# Patient Record
Sex: FEMALE | Race: BLACK OR AFRICAN AMERICAN | Employment: FULL TIME | URBAN - METROPOLITAN AREA
[De-identification: names, ages, dates, MRNs, and addresses within clinical notes are randomized per-mention and may not be internally consistent; named-entity substitution may affect disease eponyms.]

---

## 2017-01-08 ENCOUNTER — TELEPHONE (OUTPATIENT)
Dept: NURSING | Facility: CLINIC | Age: 42
End: 2017-01-08

## 2017-01-09 ENCOUNTER — OFFICE VISIT (OUTPATIENT)
Dept: URGENT CARE | Facility: URGENT CARE | Age: 42
End: 2017-01-09
Payer: COMMERCIAL

## 2017-01-09 VITALS
HEART RATE: 76 BPM | TEMPERATURE: 98.7 F | SYSTOLIC BLOOD PRESSURE: 114 MMHG | WEIGHT: 112 LBS | OXYGEN SATURATION: 100 % | DIASTOLIC BLOOD PRESSURE: 85 MMHG | BODY MASS INDEX: 19.22 KG/M2

## 2017-01-09 DIAGNOSIS — R30.0 DYSURIA: ICD-10-CM

## 2017-01-09 DIAGNOSIS — B37.31 YEAST VAGINITIS: Primary | ICD-10-CM

## 2017-01-09 LAB
ALBUMIN UR-MCNC: NEGATIVE MG/DL
APPEARANCE UR: CLEAR
BILIRUB UR QL STRIP: NEGATIVE
COLOR UR AUTO: YELLOW
GLUCOSE UR STRIP-MCNC: NEGATIVE MG/DL
HGB UR QL STRIP: ABNORMAL
KETONES UR STRIP-MCNC: NEGATIVE MG/DL
LEUKOCYTE ESTERASE UR QL STRIP: ABNORMAL
MICRO REPORT STATUS: ABNORMAL
NITRATE UR QL: NEGATIVE
PH UR STRIP: 7 PH (ref 5–7)
RBC #/AREA URNS AUTO: ABNORMAL /HPF (ref 0–2)
SP GR UR STRIP: 1.01 (ref 1–1.03)
SPECIMEN SOURCE: ABNORMAL
URN SPEC COLLECT METH UR: ABNORMAL
UROBILINOGEN UR STRIP-ACNC: 0.2 EU/DL (ref 0.2–1)
WBC #/AREA URNS AUTO: ABNORMAL /HPF (ref 0–2)
WET PREP SPEC: ABNORMAL

## 2017-01-09 PROCEDURE — 87086 URINE CULTURE/COLONY COUNT: CPT | Performed by: INTERNAL MEDICINE

## 2017-01-09 PROCEDURE — 87088 URINE BACTERIA CULTURE: CPT | Performed by: INTERNAL MEDICINE

## 2017-01-09 PROCEDURE — 87210 SMEAR WET MOUNT SALINE/INK: CPT | Performed by: INTERNAL MEDICINE

## 2017-01-09 PROCEDURE — 81001 URINALYSIS AUTO W/SCOPE: CPT | Performed by: INTERNAL MEDICINE

## 2017-01-09 PROCEDURE — 99213 OFFICE O/P EST LOW 20 MIN: CPT

## 2017-01-09 RX ORDER — FLUCONAZOLE 150 MG/1
150 TABLET ORAL ONCE
Qty: 1 TABLET | Refills: 0 | Status: SHIPPED | OUTPATIENT
Start: 2017-01-09 | End: 2017-01-09

## 2017-01-09 NOTE — TELEPHONE ENCOUNTER
"Call Type: Triage Call    Presenting Problem: \"Vagina pain and burning with urination. Had  surgery in May of 2105 and it hurts and burns up in my vagina, and  also abdomen hurts a little when I sit down and move around.\"  Triage Note:  Guideline Title: Vaginal Discharge or Irritation ; Vaginal Discharge or  Irritation  Recommended Disposition: See ED Immediately  Original Inclination: Wanted to speak with a nurse  Override Disposition: Activate   Intended Action: Patient does not know  Physician Contacted: No  Abdominal pain with pressure or jarring lasting 3 hours or more ?  YES  Using a pessary ? NO  Known or suspected pubic lice ? NO  Known or suspected vaginal foreign body ? NO  Abdominal/pelvic pain/cramping ? NO  Constant lower abdominal or pelvic pain lasting 3 hours or more ? NO  Unbearable abdominal/pelvic pain ? NO  Blisters or other lesions on vulva or vaginal opening ? NO  Recent childbirth or miscarriage ? NO  Physician Instructions:  Care Advice: Allow the patient to be in a position of comfort.  Another adult should drive.  Bring any tissue that has passed for examination by provider.  Do not eat or drink anything until evaluated by provider.  Call  if signs and symptoms of shock develop (such as unable to  stand due to faintness, dizziness, or lightheadedness  new onset of confusion  slow to respond or difficult to awaken  skin is pale, gray, cool, or moist to touch  severe weakness  loss of consciousness).  IMMEDIATE ACTION  Write down provider's name. List or place the following in a bag for  transport with the patient: current prescription and/or nonprescription  medications  alternative treatments, therapies and medications  and street drugs.  Refrain from douching, using feminine hygiene sprays, scented deodorant  tampons, or nonprescription intravaginal medication until evaluated by a  provider.  "

## 2017-01-09 NOTE — MR AVS SNAPSHOT
After Visit Summary   1/9/2017    Cindi Aguayo    MRN: [id number]           Patient Information     Date Of Birth          1975        Visit Information        Provider Department      1/9/2017 6:00 PM Provider, [facility]        Today's Diagnoses     Dysuria    -  1     Acute cystitis without hematuria         Yeast vaginitis           Care Instructions    Diflucan pill.  For comfort, can buy lotrimin vaginal cream     Specimen Description      Vagina     Wet Prep (Abnormal)     Yeast seen   No Trichomonas seen   No clue cells seen        Micro Report Status     FINAL 01/09/2017         Component      Latest Ref Rng 1/9/2017   Color Urine       Yellow   Appearance Urine       Clear   Glucose Urine      NEG mg/dL Negative   Bilirubin Urine      NEG Negative   Ketones Urine      NEG mg/dL Negative   Specific Gravity Urine      1.003 - 1.035 1.010   Blood Urine      NEG Small (A)   pH Urine      5.0 - 7.0 pH 7.0   Protein Albumin Urine      NEG mg/dL Negative   Urobilinogen Urine      0.2 - 1.0 EU/dL 0.2   Nitrite Urine      NEG Negative   Leukocyte Esterase Urine      NEG Trace (A)   Source       Midstream Urine   WBC Urine      0 - 2 /HPF O - 2   RBC Urine      0 - 2 /HPF 2-5 (A)     Vaginal Infection: Yeast (Candidiasis)  Yeast infection occurs when yeast in the vagina increase and start attacking the vaginal tissues. Yeast is a type of fungus. These infections are often caused by a type of yeast called Candida albicans. Other species of yeast can also cause infections. Factors that may make infection more likely include recent antibiotic use, douching, or increased sex. Yeast infections are more common in women who have diabetes, or are obese or pregnant, or have a weak immune system.  Symptoms of yeast infection    Clumpy or thin, white discharge, which may look like cottage cheese    No odor or minimal odor    Severe vaginal itching or  burning    Burning with urination    Swelling, redness of vulva    Pain during sex  Treating yeast infection  Yeast infection is treated with a vaginal antifungal cream. In some cases, antifungal pills are prescribed instead. During treatment:    Finish all of your medicine, even if your symptoms go away.    Apply the cream before going to bed. Lie flat after applying so that it doesn't drip out.    Do not douche or use tampons.    Don't rely on a diaphragm or condoms, since the cream may weaken them.    Avoid intercourse if advised by your healthcare provider.     Should I treat a yeast infection myself?  Discuss with your healthcare provider whether you should use over-the-counter medicines to treat a yeast infection. Self-treatment may depend on whether:    You've had a yeast infection in the past.    You're at risk for STDs.  Call your healthcare provider if symptoms do not go away or come back after treatment.     3772-0760 [employer]. [address] [place] [address]. All rights reserved. This information is not intended as a substitute for professional medical care. Always follow your healthcare professional's instructions.              Follow-ups after your visit        Who to contact     If you have questions or need follow up information about today's clinic visit or your schedule please contact [facility] directly at [phone].  Normal or non-critical lab and imaging results will be communicated to you by MyChart, letter or phone within 4 business days after the clinic has received the results. If you do not hear from us within 7 days, please contact the clinic through MyChart or phone. If you have a critical or abnormal lab result, we will notify you by phone as soon as possible.  Submit refill requests through [employer] or call your pharmacy and they will forward the refill request to us. Please allow 3 business days for your refill to be completed.        "   Additional Information About Your Visit        MyChart Information     [employer] lets you send messages to your doctor, view your test results, renew your prescriptions, schedule appointments and more. To sign up, go to [email or web address] . Click on \"Log in\" on the left side of the screen, which will take you to the Welcome page. Then click on \"Sign up Now\" on the right side of the page.     You will be asked to enter the access code listed below, as well as some personal information. Please follow the directions to create your username and password.     Your access code is: [email or web address]  Expires: 2017  8:01 PM     Your access code will  in 90 days. If you need help or a new code, please call your [facility] clinic or [phone].        Care EveryWhere ID     This is your Care EveryWhere ID. This could be used by other organizations to access your [facility] medical records  [id number]        Your Vitals Were     Pulse Temperature Pulse Oximetry Last Period          76 98.7  F (37.1  C) (Tympanic) 100% 2016         Blood Pressure from Last 3 Encounters:   17 114/85   16 116/72   16 100/70    Weight from Last 3 Encounters:   17 112 lb (50.803 kg)   16 106 lb (48.081 kg)   16 102 lb (46.267 kg)              We Performed the Following     *UA reflex to Microscopic and Culture ([facility] and [facility] (except [place] and [place])     Urine Culture Aerobic Bacterial     Urine Microscopic     Wet prep          Today's Medication Changes          These changes are accurate as of: 17  8:01 PM.  If you have any questions, ask your nurse or doctor.               Start taking these medicines.        Dose/Directions    fluconazole 150 MG tablet   Commonly known as:  DIFLUCAN   Used for:  Yeast vaginitis   Started by:  Provider, Sherita Shabazz        Dose:  150 mg   Take 1 tablet (150 mg) by mouth once for 1 dose   Quantity:  1 tablet   Refills:  0 "            Where to get your medicines      These medications were sent to [facility] - [place], [place] - [address] AT [facility]  [address], [place] [address]     Phone:  [phone]    - fluconazole 150 MG tablet             Primary Care Provider Office Phone # Fax #    Jillian Aguilera [phone]       [place] [address] [facility] [address]  [facility] [address]        Thank you!     Thank you for choosing [facility]  for your care. Our goal is always to provide you with excellent care. Hearing back from our patients is one way we can continue to improve our services. Please take a few minutes to complete the written survey that you may receive in the mail after your visit with us. Thank you!             Your Updated Medication List - Protect others around you: Learn how to safely use, store and throw away your medicines at www.disposemymeds.org.          This list is accurate as of: 1/9/17  8:01 PM.  Always use your most recent med list.                   Brand Name Dispense Instructions for use    calcium carbonate 500 MG chewable tablet    TUMS     Take 1 chew tab by mouth daily       diphenhydrAMINE 25 MG tablet    BENADRYL    60 tablet    Take 1-2 tablets at bedtime for sleep.       docusate sodium 100 MG tablet    COLACE    60 tablet    Take 100 mg by mouth daily       ferrous sulfate 325 (65 FE) MG tablet    IRON    60 tablet    Take 1 tablet (325 mg) by mouth 2 times daily       fluconazole 150 MG tablet    DIFLUCAN    1 tablet    Take 1 tablet (150 mg) by mouth once for 1 dose       fluticasone 50 MCG/ACT spray    FLONASE    1 Package    Spray 1-2 sprays into both nostrils daily       ketoconazole 2 % cream    NIZORAL    15 g    Apply topically 2 times daily as needed for itching       M-VIT Tabs     100 tablet    Take 1 tablet by mouth daily       norethindrone-ethinyl estradiol 1.5-30 MG-MCG per tablet     GILDESS 1.5/30    84 tablet    Take 1 tablet by mouth daily       omeprazole 20 MG tablet     180 tablet    Take 1 tablet (20 mg) by mouth 2 times daily Take 30-60 minutes before a meal.       pantoprazole 20 MG EC tablet    PROTONIX    90 tablet    Take 1 tablet (20 mg) by mouth 2 times daily (before meals)       pramox-pe-glycerin-petrolatum 1-0.25-14.4-15 % Crea cream    PREPARATION H    1 Tube    Place rectally 3 times daily       triamcinolone 0.1 % cream    KENALOG    30 g    Apply sparingly to affected area three times daily for 14 days.

## 2017-01-10 NOTE — PATIENT INSTRUCTIONS
Diflucan pill.  For comfort, can buy lotrimin vaginal cream     Specimen Description      Vagina     Wet Prep (Abnormal)     Yeast seen   No Trichomonas seen   No clue cells seen        Micro Report Status     FINAL 01/09/2017         Component      Latest Ref Rng 1/9/2017   Color Urine       Yellow   Appearance Urine       Clear   Glucose Urine      NEG mg/dL Negative   Bilirubin Urine      NEG Negative   Ketones Urine      NEG mg/dL Negative   Specific Gravity Urine      1.003 - 1.035 1.010   Blood Urine      NEG Small (A)   pH Urine      5.0 - 7.0 pH 7.0   Protein Albumin Urine      NEG mg/dL Negative   Urobilinogen Urine      0.2 - 1.0 EU/dL 0.2   Nitrite Urine      NEG Negative   Leukocyte Esterase Urine      NEG Trace (A)   Source       Midstream Urine   WBC Urine      0 - 2 /HPF O - 2   RBC Urine      0 - 2 /HPF 2-5 (A)     Vaginal Infection: Yeast (Candidiasis)  Yeast infection occurs when yeast in the vagina increase and start attacking the vaginal tissues. Yeast is a type of fungus. These infections are often caused by a type of yeast called Candida albicans. Other species of yeast can also cause infections. Factors that may make infection more likely include recent antibiotic use, douching, or increased sex. Yeast infections are more common in women who have diabetes, or are obese or pregnant, or have a weak immune system.  Symptoms of yeast infection    Clumpy or thin, white discharge, which may look like cottage cheese    No odor or minimal odor    Severe vaginal itching or burning    Burning with urination    Swelling, redness of vulva    Pain during sex  Treating yeast infection  Yeast infection is treated with a vaginal antifungal cream. In some cases, antifungal pills are prescribed instead. During treatment:    Finish all of your medicine, even if your symptoms go away.    Apply the cream before going to bed. Lie flat after applying so that it doesn't drip out.    Do not douche or use  tampons.    Don't rely on a diaphragm or condoms, since the cream may weaken them.    Avoid intercourse if advised by your healthcare provider.     Should I treat a yeast infection myself?  Discuss with your healthcare provider whether you should use over-the-counter medicines to treat a yeast infection. Self-treatment may depend on whether:    You've had a yeast infection in the past.    You're at risk for STDs.  Call your healthcare provider if symptoms do not go away or come back after treatment.     9477-6259 [employer]. [address] [place] [address], [place] [address]. All rights reserved. This information is not intended as a substitute for professional medical care. Always follow your healthcare professional's instructions.

## 2017-01-10 NOTE — NURSING NOTE
"Chief Complaint   Patient presents with     Urgent Care     Pt in clinic to have eval for dysuria and frequency.     Dysuria       Initial /85 mmHg  Pulse 76  Temp(Src) 98.7  F (37.1  C) (Tympanic)  Wt 112 lb (50.803 kg)  SpO2 100%  LMP 01/29/2016 Estimated body mass index is 19.22 kg/(m^2) as calculated from the following:    Height as of 3/30/16: 5' 4\" (1.626 m).    Weight as of this encounter: 112 lb (50.803 kg).  BP completed using cuff size: regular  Carmen Poe/ MA    "

## 2017-01-10 NOTE — PROGRESS NOTES
SUBJECTIVE: Cindi Aguayo is a 41 year old female who complains of urinary frequency, urgency and dysuria x 3 days,   Chief Complaint   Patient presents with     Urgent Care     Pt in clinic to have eval for dysuria and frequency.     Dysuria     Hard to hold urine  without flank pain, fever, chills, or abnormal vaginal discharge or bleeding.   Balderas inside vagina    OBJECTIVE:   /85 mmHg  Pulse 76  Temp(Src) 98.7  F (37.1  C) (Tympanic)  Wt 112 lb (50.803 kg)  SpO2 100%  LMP 01/29/2016    Appears well, in no apparent distress.  Vital signs are normal. The abdomen is soft without tenderness, guarding, mass, rebound or organomegaly. No CVA tenderness  Vaginal area appears irritated.  Wet prep performed    Component      Latest Ref Rng 1/9/2017   Color Urine       Yellow   Appearance Urine       Clear   Glucose Urine      NEG mg/dL Negative   Bilirubin Urine      NEG Negative   Ketones Urine      NEG mg/dL Negative   Specific Gravity Urine      1.003 - 1.035 1.010   Blood Urine      NEG Small (A)   pH Urine      5.0 - 7.0 pH 7.0   Protein Albumin Urine      NEG mg/dL Negative   Urobilinogen Urine      0.2 - 1.0 EU/dL 0.2   Nitrite Urine      NEG Negative   Leukocyte Esterase Urine      NEG Trace (A)   Source       Midstream Urine   WBC Urine      0 - 2 /HPF O - 2   RBC Urine      0 - 2 /HPF 2-5 (A)       ASSESSMENT:  (B37.3) Yeast vaginitis  (primary encounter diagnosis)  Comment:   Plan: fluconazole (DIFLUCAN) 150 MG tablet            (R30.0) Dysuria  Comment:   Plan: *UA reflex to Microscopic and Culture         ([facility] (except         [place] and [place]), Urine Microscopic,         Wet prep             Patient Instructions     Diflucan pill.  For comfort, can buy lotrimin vaginal cream     Specimen Description      Vagina     Wet Prep (Abnormal)     Yeast seen   No Trichomonas seen   No clue cells seen        Micro Report Status     FINAL 01/09/2017          Component      Latest Ref Rng 1/9/2017   Color Urine       Yellow   Appearance Urine       Clear   Glucose Urine      NEG mg/dL Negative   Bilirubin Urine      NEG Negative   Ketones Urine      NEG mg/dL Negative   Specific Gravity Urine      1.003 - 1.035 1.010   Blood Urine      NEG Small (A)   pH Urine      5.0 - 7.0 pH 7.0   Protein Albumin Urine      NEG mg/dL Negative   Urobilinogen Urine      0.2 - 1.0 EU/dL 0.2   Nitrite Urine      NEG Negative   Leukocyte Esterase Urine      NEG Trace (A)   Source       Midstream Urine   WBC Urine      0 - 2 /HPF O - 2   RBC Urine      0 - 2 /HPF 2-5 (A)     Vaginal Infection: Yeast (Candidiasis)  Yeast infection occurs when yeast in the vagina increase and start attacking the vaginal tissues. Yeast is a type of fungus. These infections are often caused by a type of yeast called Candida albicans. Other species of yeast can also cause infections. Factors that may make infection more likely include recent antibiotic use, douching, or increased sex. Yeast infections are more common in women who have diabetes, or are obese or pregnant, or have a weak immune system.  Symptoms of yeast infection    Clumpy or thin, white discharge, which may look like cottage cheese    No odor or minimal odor    Severe vaginal itching or burning    Burning with urination    Swelling, redness of vulva    Pain during sex  Treating yeast infection  Yeast infection is treated with a vaginal antifungal cream. In some cases, antifungal pills are prescribed instead. During treatment:    Finish all of your medicine, even if your symptoms go away.    Apply the cream before going to bed. Lie flat after applying so that it doesn't drip out.    Do not douche or use tampons.    Don't rely on a diaphragm or condoms, since the cream may weaken them.    Avoid intercourse if advised by your healthcare provider.     Should I treat a yeast infection myself?  Discuss with your healthcare provider whether you  should use over-the-counter medicines to treat a yeast infection. Self-treatment may depend on whether:    You've had a yeast infection in the past.    You're at risk for STDs.  Call your healthcare provider if symptoms do not go away or come back after treatment.     6499-3620 [employer]. [address] [place] [address], [place] [address]. All rights reserved. This information is not intended as a substitute for professional medical care. Always follow your healthcare professional's instructions.

## 2017-01-11 ENCOUNTER — TELEPHONE (OUTPATIENT)
Dept: URGENT CARE | Facility: URGENT CARE | Age: 42
End: 2017-01-11

## 2017-01-11 LAB
BACTERIA SPEC CULT: ABNORMAL
MICRO REPORT STATUS: ABNORMAL
SPECIMEN SOURCE: ABNORMAL

## 2017-01-11 NOTE — TELEPHONE ENCOUNTER
Inform patient that urine culture also grew out infection.  Call in Amoxicillin 500 mg 3 x day for 5 days.

## 2017-04-12 ENCOUNTER — CARE COORDINATION (OUTPATIENT)
Dept: GASTROENTEROLOGY | Facility: CLINIC | Age: 42
End: 2017-04-12

## 2017-04-12 NOTE — PROGRESS NOTES
Patient called to discuss medications and abdominal pain. Informed patient we have not prescribed medications in over 1 year and that her follow up h pylori test was negative for infections. Informed her that she would need to be seen in clinic to discuss ongoing symptoms as she has not been seen in over a year for evaluation. Informed her we would arrange for a consult in our general GI clinic to discuss further management of her abdominal pain.    Suzanne Sevilla RN

## 2017-04-13 ENCOUNTER — TELEPHONE (OUTPATIENT)
Dept: GASTROENTEROLOGY | Facility: CLINIC | Age: 42
End: 2017-04-13

## 2017-04-13 ENCOUNTER — PRE VISIT (OUTPATIENT)
Dept: GASTROENTEROLOGY | Facility: CLINIC | Age: 42
End: 2017-04-13

## 2017-04-13 NOTE — TELEPHONE ENCOUNTER
1.  Date/reason for appt: 4/24/17 2:30PM Abdominal Pain  2.  Referring provider: ARIANA VICTORIA MD  3.  Call to patient (Yes / No - short description): No, recs are in Epic   4.  Previous care at / records requested from:  [facility] notes - 11/10/2014  Upper EGD- 11/23/15   PACS -  NM Gastric Emptying  9/10/15

## 2017-04-18 ENCOUNTER — TELEPHONE (OUTPATIENT)
Dept: GASTROENTEROLOGY | Facility: CLINIC | Age: 42
End: 2017-04-18

## 2017-09-01 ENCOUNTER — TELEPHONE (OUTPATIENT)
Dept: OBGYN | Facility: CLINIC | Age: 42
End: 2017-09-01

## 2017-09-01 NOTE — TELEPHONE ENCOUNTER
"Patient calling regarding bladder concerns post surgery (had surgery at an outside clinic with another provider). Patient stated that since then she has been having recurrent UTIs and abdominal pain. Suggested that she call the clinic that did the surgery, but \"it is too far away\". I did suggest that she been seen in ER if pain severe. Can schedule with our clinic, but would be several weeks out. Patient stated that she wants to go to a clinic that they \"could scan my bladder and talk about my urine symptoms\" - number for Urology clinic given to patient. Will call back if needed.  Monika Gibbs    "

## 2017-09-05 ENCOUNTER — PRE VISIT (OUTPATIENT)
Dept: UROLOGY | Facility: CLINIC | Age: 42
End: 2017-09-05

## 2017-10-02 ENCOUNTER — PRE VISIT (OUTPATIENT)
Dept: UROLOGY | Facility: CLINIC | Age: 42
End: 2017-10-02

## 2017-10-02 NOTE — TELEPHONE ENCOUNTER
Patient with incontinence and dysuria coming in for a consult. Chart reviewed and all records available. Message left asking pt to come with a full bladder for a dip/pvr.

## 2017-10-05 ENCOUNTER — OFFICE VISIT (OUTPATIENT)
Dept: UROLOGY | Facility: CLINIC | Age: 42
End: 2017-10-05

## 2017-10-05 VITALS
BODY MASS INDEX: 18.98 KG/M2 | HEART RATE: 64 BPM | WEIGHT: 111.2 LBS | SYSTOLIC BLOOD PRESSURE: 111 MMHG | HEIGHT: 64 IN | DIASTOLIC BLOOD PRESSURE: 72 MMHG

## 2017-10-05 DIAGNOSIS — R10.2 PELVIC PRESSURE IN FEMALE: ICD-10-CM

## 2017-10-05 DIAGNOSIS — N39.41 URGE INCONTINENCE: Primary | ICD-10-CM

## 2017-10-05 LAB
ALBUMIN UR-MCNC: NEGATIVE MG/DL
APPEARANCE UR: CLEAR
BILIRUB UR QL STRIP: NEGATIVE
COLOR UR AUTO: YELLOW
GLUCOSE UR STRIP-MCNC: NEGATIVE MG/DL
HGB UR QL STRIP: NEGATIVE
KETONES UR STRIP-MCNC: NEGATIVE MG/DL
LEUKOCYTE ESTERASE UR QL STRIP: NEGATIVE
MUCOUS THREADS #/AREA URNS LPF: PRESENT /LPF
NITRATE UR QL: NEGATIVE
PH UR STRIP: 7 PH (ref 5–7)
RBC #/AREA URNS AUTO: 1 /HPF (ref 0–2)
SOURCE: ABNORMAL
SP GR UR STRIP: 1.01 (ref 1–1.03)
SQUAMOUS #/AREA URNS AUTO: 1 /HPF (ref 0–1)
UROBILINOGEN UR STRIP-MCNC: 0 MG/DL (ref 0–2)
WBC #/AREA URNS AUTO: 1 /HPF (ref 0–2)

## 2017-10-05 ASSESSMENT — ENCOUNTER SYMPTOMS
JOINT SWELLING: 0
BACK PAIN: 0
HEADACHES: 0
TACHYCARDIA: 0
PANIC: 0
LEG SWELLING: 0
SINUS PAIN: 0
ABDOMINAL PAIN: 1
HEMOPTYSIS: 0
MUSCLE WEAKNESS: 0
ARTHRALGIAS: 0
DECREASED CONCENTRATION: 0
SHORTNESS OF BREATH: 0
HYPERTENSION: 0
WEAKNESS: 0
SMELL DISTURBANCE: 0
SWOLLEN GLANDS: 0
DISTURBANCES IN COORDINATION: 0
WHEEZING: 0
COUGH: 0
MEMORY LOSS: 0
POOR WOUND HEALING: 0
RESPIRATORY PAIN: 0
INSOMNIA: 0
LIGHT-HEADEDNESS: 0
PALPITATIONS: 0
HOARSE VOICE: 0
EXERCISE INTOLERANCE: 0
SORE THROAT: 0
SINUS CONGESTION: 0
MYALGIAS: 0
HOT FLASHES: 0
SYNCOPE: 0
NECK MASS: 0
NUMBNESS: 0
TREMORS: 0
SKIN CHANGES: 0
ORTHOPNEA: 0
POSTURAL DYSPNEA: 0
TASTE DISTURBANCE: 0
FEVER: 1
DECREASED LIBIDO: 0
EYE PAIN: 1
HYPOTENSION: 0
LOSS OF CONSCIOUSNESS: 0
SEIZURES: 0
DYSPNEA ON EXERTION: 0
SLEEP DISTURBANCES DUE TO BREATHING: 0
NAIL CHANGES: 0
SPUTUM PRODUCTION: 0
SNORES LOUDLY: 0
EXTREMITY NUMBNESS: 0
DEPRESSION: 0
LEG PAIN: 0
NECK PAIN: 0
NERVOUS/ANXIOUS: 0
BREAST MASS: 0
BRUISES/BLEEDS EASILY: 0
CLAUDICATION: 0
BREAST PAIN: 0
MUSCLE CRAMPS: 0
PARALYSIS: 0
STIFFNESS: 0
DIZZINESS: 0
SPEECH CHANGE: 0
COUGH DISTURBING SLEEP: 0
TROUBLE SWALLOWING: 0
TINGLING: 0

## 2017-10-05 ASSESSMENT — PAIN SCALES - GENERAL: PAINLEVEL: NO PAIN (0)

## 2017-10-05 NOTE — PROGRESS NOTES
October 5, 2017    Referring Provider: Referred Self, MD  No address on file    Primary Care Provider: Jillian Aguilera    CC: Urge incontinence    HPI:  Cindi Aguayo is a 42 year old female who presents for evaluation of her pelvic floor symptoms.  She underwent a robotic assisted hysterectomy 5/2016 menorrhagia.  Since that surgery she has had crampy abdominal pain, does not take anything for.  She also notes some urinary urgency incontinence as well.  She states that after she has had multiple urgent care visits, did get treated for group B strep back in January which did transiently helped some.  Has a lot of pelvic pressure.  Is sexually active, has had dyspareunia even prior to surgery.    Denies hematuria, vaginal bleeding, vaginal bulge, constipation.      Denies history of abuse.  Reports that she feels safe at home.    Past Medical History:   Diagnosis Date     Anorexia      Fibroids, intramural 1/2015    2 x 2 cm impinging on cavity uterus     Nausea and vomiting      Sleep deprivation      Weight loss      Past Surgical History:   Procedure Laterality Date     C IUD,MIRENA  1/13/15-3/30/16    removed for bleeding/discharge     ESOPHAGOSCOPY, GASTROSCOPY, DUODENOSCOPY (EGD), COMBINED Left 11/23/2015    Procedure: COMBINED ESOPHAGOSCOPY, GASTROSCOPY, DUODENOSCOPY (EGD), BIOPSY SINGLE OR MULTIPLE;  Surgeon: Brendan Tony MD;  Location:  GI     NO HISTORY OF SURGERY       Social History     Social History     Marital status:      Spouse name: N/A     Number of children: 4     Years of education: N/A     Occupational History      Unemployed     Social History Main Topics     Smoking status: Never Smoker     Smokeless tobacco: Never Used     Alcohol use No     Drug use: No     Sexual activity: Yes     Partners: Male     Birth control/ protection: IUD      Comment: mirena 1/15     Other Topics Concern     Not on file     Social History Narrative       Family History   Problem Relation  Age of Onset     Family History Negative No family hx of      Review of Systems     Constitutional:  Positive for fever.   HENT:  Negative for ear pain, hearing loss, tinnitus, nosebleeds, trouble swallowing, hoarse voice, mouth sores, sore throat, ear discharge, tooth pain, gum tenderness, taste disturbance, smell disturbance, hearing aid, bleeding gums, dry mouth, sinus pain, sinus congestion and neck mass.    Eyes:  Positive for pain and eye pain.   Respiratory:   Negative for cough, hemoptysis, sputum production, shortness of breath, wheezing, sleep disturbances due to breathing, snores loudly, respiratory pain, dyspnea on exertion, cough disturbing sleep and postural dyspnea.    Cardiovascular:  Negative for chest pain, dyspnea on exertion, palpitations, orthopnea, claudication, leg swelling, fingers/toes turn blue, hypertension, hypotension, syncope, history of heart murmur, chest pain on exertion, chest pain at rest, pacemaker, few scattered varicosities, leg pain, sleep disturbances due to breathing, tachycardia, light-headedness, exercise intolerance and edema.   Gastrointestinal:  Positive for abdominal pain.   Genitourinary:  Negative for vaginal discharge, genital sores, dyspareunia, decreased libido, arousal difficulty, abnormal vaginal bleeding, excessive menstruation, menstrual changes, hot flashes, vaginal dryness and postmenopausal bleeding.   Musculoskeletal:  Negative for myalgias, back pain, joint swelling, arthralgias, stiffness, muscle cramps, neck pain, bone pain, muscle weakness and fracture.   Skin:  Negative for nail changes, itching, poor wound healing, rash, hair changes, skin changes, acne, warts, poor wound healing, scarring, flaky skin, Raynaud's phenomenon, sensitivity to sunlight and skin thickening.   Neurological:  Negative for dizziness, tingling, tremors, speech change, seizures, loss of consciousness, weakness, light-headedness, numbness, headaches, disturbances in coordination,  "extremity numbness, memory loss, difficulty walking and paralysis.   Endo/Heme:  Negative for anemia, swollen glands and bruises/bleeds easily.   Psychiatric/Behavioral:  Negative for depression, memory loss, decreased concentration, mood swings and panic attacks.    Breast:  Negative for breast discharge, breast mass, breast pain and nipple retraction.   Endocrine:  Negative for unwanted hair growth and change in facial hair.    Allergies   Allergen Reactions     No Known Drug Allergies        No current outpatient prescriptions on file.     No current facility-administered medications for this visit.      /72  Pulse 64  Ht 1.626 m (5' 4\")  Wt 50.4 kg (111 lb 3.2 oz)  LMP 01/29/2016  BMI 19.09 kg/m2 Patient's last menstrual period was 01/29/2016. Body mass index is 19.09 kg/(m^2).  She is alert, comfortable in no acute distress, non-labored breathing. Pelvic exam deferred to the time of cystoscopy    Urine dip negative    PVR 12 mL by bladder scan    A/P: Cindi Aguayo is a 42 year old F with urinary urgency incontinence and pelvic pressure since hysterectomy, history of dyspareunia     At this time patient is very concerned that her symptoms are related to something from her prior surgery.  We discussed that it is reasonable to do a cystoscopy to evaluate the interior of her bladder given the symptoms started after her hysterectomy.      Although patient speaks English well, we discussed that when she returns for a cystoscopy and pelvic exam that I would like an  to be present to make sure that we were communicating appropriately.    Also sent urine for ureaplasma, mycoplasma, urinalysis.    30 minutes were spent with the patient today, > 50% in counseling and coordination of care    Blanca Roth MD MPH    Urology    CC  Patient Care Team:  Jillian Aguilera MD as PCP - General (OB/Gyn)  Brendan Tony MD as MD (Gastroenterology)  Cleo Elder, " APRN CNP as Nurse Practitioner (Nurse Practitioner)  Blanca Roth MD as MD (Urology)  Sandra Fletcher, RN as Registered Nurse (Urology)  SELF, REFERRED

## 2017-10-05 NOTE — LETTER
10/5/2017       RE: Cindi Aguayo  [address] [place] [address]     Dear Colleague,    Thank you for referring your patient, Cindi Aguayo, to the [facility] UROLOGY AND INST FOR PROSTATE AND UROLOGIC CANCERS at [facility]. Please see a copy of my visit note below.    October 5, 2017    Referring Provider: Referred Self, MD  No address on file    Primary Care Provider: Jillian Aguilera    CC: Urge incontinence    HPI:  Cindi Aguayo is a 42 year old female who presents for evaluation of her pelvic floor symptoms.  She underwent a robotic assisted hysterectomy 5/2016 menorrhagia.  Since that surgery she has had crampy abdominal pain, does not take anything for.  She also notes some urinary urgency incontinence as well.  She states that after she has had multiple urgent care visits, did get treated for group B strep back in January which did transiently helped some.  Has a lot of pelvic pressure.  Is sexually active, has had dyspareunia even prior to surgery.    Denies hematuria, vaginal bleeding, vaginal bulge, constipation.      Denies history of abuse.  Reports that she feels safe at home.    Past Medical History:   Diagnosis Date     Anorexia      Fibroids, intramural 1/2015    2 x 2 cm impinging on cavity uterus     Nausea and vomiting      Sleep deprivation      Weight loss      Past Surgical History:   Procedure Laterality Date     C IUD,MIRENA  1/13/15-3/30/16    removed for bleeding/discharge     ESOPHAGOSCOPY, GASTROSCOPY, DUODENOSCOPY (EGD), COMBINED Left 11/23/2015    Procedure: COMBINED ESOPHAGOSCOPY, GASTROSCOPY, DUODENOSCOPY (EGD), BIOPSY SINGLE OR MULTIPLE;  Surgeon: Brendan Tony MD;  Location: [facility]     NO HISTORY OF SURGERY       Social History     Social History     Marital status:      Spouse name: N/A     Number of children: 4     Years of education: N/A     Occupational History      Unemployed     Social History Main  Topics     Smoking status: Never Smoker     Smokeless tobacco: Never Used     Alcohol use No     Drug use: No     Sexual activity: Yes     Partners: Male     Birth control/ protection: IUD      Comment: mirena 1/15     Other Topics Concern     Not on file     Social History Narrative       Family History   Problem Relation Age of Onset     Family History Negative No family hx of      Review of Systems     Constitutional:  Positive for fever.   HENT:  Negative for ear pain, hearing loss, tinnitus, nosebleeds, trouble swallowing, hoarse voice, mouth sores, sore throat, ear discharge, tooth pain, gum tenderness, taste disturbance, smell disturbance, hearing aid, bleeding gums, dry mouth, sinus pain, sinus congestion and neck mass.    Eyes:  Positive for pain and eye pain.   Respiratory:   Negative for cough, hemoptysis, sputum production, shortness of breath, wheezing, sleep disturbances due to breathing, snores loudly, respiratory pain, dyspnea on exertion, cough disturbing sleep and postural dyspnea.    Cardiovascular:  Negative for chest pain, dyspnea on exertion, palpitations, orthopnea, claudication, leg swelling, fingers/toes turn blue, hypertension, hypotension, syncope, history of heart murmur, chest pain on exertion, chest pain at rest, pacemaker, few scattered varicosities, leg pain, sleep disturbances due to breathing, tachycardia, light-headedness, exercise intolerance and edema.   Gastrointestinal:  Positive for abdominal pain.   Genitourinary:  Negative for vaginal discharge, genital sores, dyspareunia, decreased libido, arousal difficulty, abnormal vaginal bleeding, excessive menstruation, menstrual changes, hot flashes, vaginal dryness and postmenopausal bleeding.   Musculoskeletal:  Negative for myalgias, back pain, joint swelling, arthralgias, stiffness, muscle cramps, neck pain, bone pain, muscle weakness and fracture.   Skin:  Negative for nail changes, itching, poor wound healing, rash, hair  "changes, skin changes, acne, warts, poor wound healing, scarring, flaky skin, Raynaud's phenomenon, sensitivity to sunlight and skin thickening.   Neurological:  Negative for dizziness, tingling, tremors, speech change, seizures, loss of consciousness, weakness, light-headedness, numbness, headaches, disturbances in coordination, extremity numbness, memory loss, difficulty walking and paralysis.   Endo/Heme:  Negative for anemia, swollen glands and bruises/bleeds easily.   Psychiatric/Behavioral:  Negative for depression, memory loss, decreased concentration, mood swings and panic attacks.    Breast:  Negative for breast discharge, breast mass, breast pain and nipple retraction.   Endocrine:  Negative for unwanted hair growth and change in facial hair.    Allergies   Allergen Reactions     No Known Drug Allergies        No current outpatient prescriptions on file.     No current facility-administered medications for this visit.      /72  Pulse 64  Ht 1.626 m (5' 4\")  Wt 50.4 kg (111 lb 3.2 oz)  LMP 01/29/2016  BMI 19.09 kg/m2 Patient's last menstrual period was 01/29/2016. Body mass index is 19.09 kg/(m^2).  She is alert, comfortable in no acute distress, non-labored breathing. Pelvic exam deferred to the time of cystoscopy    Urine dip negative    PVR 12 mL by bladder scan    A/P: Cindi Aguayo is a 42 year old F with urinary urgency incontinence and pelvic pressure since hysterectomy, history of dyspareunia     At this time patient is very concerned that her symptoms are related to something from her prior surgery.  We discussed that it is reasonable to do a cystoscopy to evaluate the interior of her bladder given the symptoms started after her hysterectomy.      Although patient speaks English well, we discussed that when she returns for a cystoscopy and pelvic exam that I would like an  to be present to make sure that we were communicating appropriately.    Also sent urine for " ureaplasma, mycoplasma, urinalysis.    30 minutes were spent with the patient today, > 50% in counseling and coordination of care    Blanca Roth MD MPH    Urology    CC  Patient Care Team:  Jillian Aguilera MD as PCP - General (OB/Gyn)  Brendan Tony MD as MD (Gastroenterology)  Cleo Elder, APRN CNP as Nurse Practitioner (Nurse Practitioner)  Blanca Roth MD as MD (Urology)  Sandra Fletcher, RN as Registered Nurse (Urology)  SELF, REFERRED

## 2017-10-05 NOTE — PATIENT INSTRUCTIONS
Please return for a cystoscopy (look in the bladder) and pelvic exam and schedule this appointment with a female . Patient will call to schedule, she was given number.    We will let you know if the urine tests are abnormal or if after reviewing your records we recommend any more testing

## 2017-10-05 NOTE — MR AVS SNAPSHOT
After Visit Summary   10/5/2017    Cindi Aguayo    MRN: [id number]           Patient Information     Date Of Birth          1975        Visit Information        Provider Department      10/5/2017 12:30 PM Blanca Roth MD [facility] Urology and [facility] for Prostate and Urologic Cancers        Today's Diagnoses     Urge incontinence    -  1    Pelvic pressure in female          Care Instructions    Please return for a cystoscopy (look in the bladder) and pelvic exam and schedule this appointment with a female .    We will let you know if the urine tests are abnormal or if after reviewing your records we recommend any more testing              Follow-ups after your visit        Future tests that were ordered for you today     Open Future Orders        Priority Expected Expires Ordered    Routine UA with micro reflex to culture Routine  10/5/2018 10/5/2017            Who to contact     Please call your clinic at [phone] to:    Ask questions about your health    Make or cancel appointments    Discuss your medicines    Learn about your test results    Speak to your doctor   If you have compliments or concerns about an experience at your clinic, or if you wish to file a complaint, please contact [facility] Patient Relations at [phone] or email us at [email or web address]         Additional Information About Your Visit        MyChart Information     The [employer] is an electronic gateway that provides easy, online access to your medical records. With [employer], you can request a clinic appointment, read your test results, renew a prescription or communicate with your care team.     To sign up for Seent visit the website at [email or web address]   You will be asked to enter the access code listed below, as well as some personal information. Please follow the directions to create your username and password.     Your access code is:  [email or web address]  Expires: 2017  6:30 AM     Your access code will  in 90 days. If you need help or a new code, please contact your [facility] or call [phone] for assistance.        Care EveryWhere ID     This is your Care EveryWhere ID. This could be used by other organizations to access your [facility] medical records  [id number]        Your Vitals Were     Pulse Height Last Period BMI (Body Mass Index)          64 1.626 m (5' 4\") 2016 19.09 kg/m2         Blood Pressure from Last 3 Encounters:   10/05/17 111/72   17 114/85   16 116/72    Weight from Last 3 Encounters:   10/05/17 50.4 kg (111 lb 3.2 oz)   17 50.8 kg (112 lb)   16 48.1 kg (106 lb)              We Performed the Following     Mycoplasma large colony culture     Ureaplasma culture          Today's Medication Changes          These changes are accurate as of: 10/5/17  1:40 PM.  If you have any questions, ask your nurse or doctor.               Stop taking these medicines if you haven't already. Please contact your care team if you have questions.     calcium carbonate 500 MG chewable tablet   Commonly known as:  TUMS   Stopped by:  Blanca Roth MD           diphenhydrAMINE 25 MG tablet   Commonly known as:  BENADRYL   Stopped by:  Blanca Roth MD           docusate sodium 100 MG tablet   Commonly known as:  COLACE   Stopped by:  Blanca Roth MD           ferrous sulfate 325 (65 FE) MG tablet   Commonly known as:  IRON   Stopped by:  Blanca Roth MD           fluticasone 50 MCG/ACT spray   Commonly known as:  FLONASE   Stopped by:  Blanca Roth MD           ketoconazole 2 % cream   Commonly known as:  NIZORAL   Stopped by:  Blanca Roth MD           M-VIT Tabs   Stopped by:  Blanca Roth MD           norethindrone-ethinyl estradiol 1.5-30 MG-MCG per tablet   Commonly known as:  GILDESS 1.5/30   Stopped by:  Ira" Blanca Purcell MD           omeprazole 20 MG tablet   Stopped by:  Blanca Roth MD           pantoprazole 20 MG EC tablet   Commonly known as:  PROTONIX   Stopped by:  Blanca Roth MD           pramox-pe-glycerin-petrolatum 1-0.25-14.4-15 % Crea cream   Commonly known as:  PREPARATION H   Stopped by:  Blanca Roth MD           triamcinolone 0.1 % cream   Commonly known as:  KENALOG   Stopped by:  Blanca Roth MD                    Primary Care Provider Office Phone # Fax #    Jillian A MD Willy [phone]       [address] [address]        Equal Access to Services     [facility] Center[facility] : Hadii rosalie pnoce hadasho Soomaali, waaxda luqadaha, qaybta kaalmada adeegyada, david swenson . So [facility] [phone].    ATENCIÓN: Si habla español, tiene a fink disposición servicios gratuitos de asistencia lingüística. Llame al [phone].    We comply with applicable federal civil rights laws and [place] laws. We do not discriminate on the basis of race, color, national origin, age, disability, sex, sexual orientation, or gender identity.            Thank you!     Thank you for choosing [facility] UROLOGY AND [facility] FOR PROSTATE AND UROLOGIC CANCERS  for your care. Our goal is always to provide you with excellent care. Hearing back from our patients is one way we can continue to improve our services. Please take a few minutes to complete the written survey that you may receive in the mail after your visit with us. Thank you!             Your Updated Medication List - Protect others around you: Learn how to safely use, store and throw away your medicines at www.disposemymeds.org.      Notice  As of 10/5/2017  1:40 PM    You have not been prescribed any medications.

## 2017-10-05 NOTE — NURSING NOTE
"Chief Complaint   Patient presents with     Consult     Urinary urgency since hysterectomy       Blood pressure 111/72, pulse 64, height 1.626 m (5' 4\"), weight 50.4 kg (111 lb 3.2 oz), last menstrual period 01/29/2016, not currently breastfeeding. Body mass index is 19.09 kg/(m^2).    Patient Active Problem List   Diagnosis     Contraception     Esophageal reflux     CARDIOVASCULAR SCREENING; LDL GOAL LESS THAN 160       Allergies   Allergen Reactions     No Known Drug Allergies        No current outpatient prescriptions on file.       Social History   Substance Use Topics     Smoking status: Never Smoker     Smokeless tobacco: Never Used     Alcohol use DIVINE [place]  10/5/2017  12:33 PM       "

## 2017-10-12 LAB
BACTERIA SPEC CULT: NORMAL
BACTERIA SPEC CULT: NORMAL
SPECIMEN SOURCE: NORMAL
SPECIMEN SOURCE: NORMAL

## 2019-03-19 ENCOUNTER — OFFICE VISIT (OUTPATIENT)
Dept: URGENT CARE | Facility: URGENT CARE | Age: 44
End: 2019-03-19
Payer: COMMERCIAL

## 2019-03-19 VITALS
OXYGEN SATURATION: 100 % | SYSTOLIC BLOOD PRESSURE: 112 MMHG | TEMPERATURE: 100.2 F | WEIGHT: 125 LBS | DIASTOLIC BLOOD PRESSURE: 72 MMHG | HEART RATE: 85 BPM | BODY MASS INDEX: 21.46 KG/M2

## 2019-03-19 DIAGNOSIS — M79.10 MYALGIA: Primary | ICD-10-CM

## 2019-03-19 DIAGNOSIS — R53.83 FATIGUE, UNSPECIFIED TYPE: ICD-10-CM

## 2019-03-19 LAB
FLUAV+FLUBV AG SPEC QL: NEGATIVE
FLUAV+FLUBV AG SPEC QL: POSITIVE
SPECIMEN SOURCE: ABNORMAL

## 2019-03-19 PROCEDURE — 99214 OFFICE O/P EST MOD 30 MIN: CPT | Performed by: FAMILY MEDICINE

## 2019-03-19 PROCEDURE — 87804 INFLUENZA ASSAY W/OPTIC: CPT | Performed by: FAMILY MEDICINE

## 2019-03-19 RX ORDER — AZITHROMYCIN 200 MG/5ML
POWDER, FOR SUSPENSION ORAL
Qty: 32.5 ML | Refills: 0 | Status: SHIPPED | OUTPATIENT
Start: 2019-03-19 | End: 2019-03-31

## 2019-03-19 RX ORDER — IBUPROFEN 200 MG
200 TABLET ORAL EVERY 4 HOURS PRN
COMMUNITY

## 2019-03-19 RX ORDER — OSELTAMIVIR PHOSPHATE 75 MG/1
75 CAPSULE ORAL 2 TIMES DAILY
Qty: 10 CAPSULE | Refills: 0 | Status: SHIPPED | OUTPATIENT
Start: 2019-03-19 | End: 2019-03-31

## 2019-03-19 ASSESSMENT — ENCOUNTER SYMPTOMS
COUGH: 1
ARTHRALGIAS: 1

## 2019-03-19 NOTE — PROGRESS NOTES
SUBJECTIVE:   Cindi Aguayo is a 43 year old female presenting with a chief complaint of   Chief Complaint   Patient presents with     Urgent Care     Pt in clinic to have eval for cough, congestion, sweats, and aches for 5 days.     Respiratory Problems     Generalized Body Aches   this for 5 days . Was getting better then got worst.  She had fever and congestion over the last week.  She is complaining of pain in her throat that was very prominent 3 days ago now has subsided somewhat.    Fever greater than 102     No nausea vomiting no diarrhea    Complaining of myalgias but not arthralgias      She is an established patient of [facility].    URI Adult    Onset of symptoms was 5 day(s) ago.  Course of illness is worsening.    Severity moderate  Current and Associated symptoms: fever, cough - productive, sore throat, body aches and fatigue  Treatment measures tried include Tylenol/Ibuprofen.  Predisposing factors include None.        Review of Systems   Respiratory: Positive for cough.    Musculoskeletal: Positive for arthralgias.   All other systems reviewed and are negative.      Past Medical History:   Diagnosis Date     Anorexia      Fibroids, intramural 1/2015    2 x 2 cm impinging on cavity uterus     Nausea and vomiting      Sleep deprivation      Weight loss      Family History   Problem Relation Age of Onset     Family History Negative No family hx of      Current Outpatient Medications   Medication Sig Dispense Refill     ibuprofen (ADVIL/MOTRIN) 200 MG tablet Take 200 mg by mouth every 4 hours as needed for mild pain       Social History     Tobacco Use     Smoking status: Never Smoker     Smokeless tobacco: Never Used   Substance Use Topics     Alcohol use: No     Alcohol/week: 0.0 oz       OBJECTIVE  /72   Pulse 85   Temp 100.2  F (37.9  C) (Oral)   Wt 56.7 kg (125 lb)   LMP 01/29/2016   SpO2 100%   BMI 21.46 kg/m      Physical Exam   Constitutional: She is oriented to person, place, and  time. She appears well-developed.   HENT:   Head: Normocephalic.   Nose: Nose normal.   Congested with maxillary sinus tenderness to palpation   Eyes: Conjunctivae are normal. Pupils are equal, round, and reactive to light.   Neck: Normal range of motion. Neck supple.   Cardiovascular: Normal rate and regular rhythm.   Pulmonary/Chest: Effort normal and breath sounds normal.   Musculoskeletal: Normal range of motion.   Neurological: She is oriented to person, place, and time.   Skin: Skin is warm.   Nursing note and vitals reviewed.      Labs:  No results found for this or any previous visit (from the past 24 hour(s)).    X-Ray was not done.    ASSESSMENT:      ICD-10-CM    1. Myalgia M79.10 Influenza A/B antigen   2. Fatigue, unspecified type R53.83 Vitamin D Deficiency    3.  Influenza    Obvious influenza and secondary bacterial infection    Medical Decision Making:    Differential Diagnosis:  Flu, strep, uri, viral infection    Serious Comorbid Conditions:  Adult:  None    PLAN:  1.  Azithromycin and Tamiflu      Followup:    If not improving or if condition worsens, follow up with your Primary Care Provider    There are no Patient Instructions on file for this visit.

## 2019-03-31 ENCOUNTER — HOSPITAL ENCOUNTER (EMERGENCY)
Facility: CLINIC | Age: 44
Discharge: HOME | End: 2019-03-31
Attending: EMERGENCY MEDICINE | Admitting: EMERGENCY MEDICINE
Payer: COMMERCIAL

## 2019-03-31 VITALS
RESPIRATION RATE: 18 BRPM | HEART RATE: 85 BPM | SYSTOLIC BLOOD PRESSURE: 141 MMHG | TEMPERATURE: 97.8 F | OXYGEN SATURATION: 100 % | HEIGHT: 64 IN | WEIGHT: 132 LBS | DIASTOLIC BLOOD PRESSURE: 97 MMHG | BODY MASS INDEX: 22.53 KG/M2

## 2019-03-31 DIAGNOSIS — M76.31 IT BAND SYNDROME, RIGHT: ICD-10-CM

## 2019-03-31 DIAGNOSIS — S39.012A STRAIN OF LUMBAR PARASPINAL MUSCLE, INITIAL ENCOUNTER: ICD-10-CM

## 2019-03-31 PROCEDURE — 99283 EMERGENCY DEPT VISIT LOW MDM: CPT | Mod: Z6 | Performed by: EMERGENCY MEDICINE

## 2019-03-31 PROCEDURE — 99282 EMERGENCY DEPT VISIT SF MDM: CPT | Performed by: EMERGENCY MEDICINE

## 2019-03-31 RX ORDER — LIDOCAINE 4 G/G
1 PATCH TOPICAL EVERY 24 HOURS
Qty: 12 PATCH | Refills: 0 | Status: SHIPPED | OUTPATIENT
Start: 2019-03-31

## 2019-03-31 RX ORDER — NAPROXEN 250 MG/1
250 TABLET ORAL 2 TIMES DAILY PRN
Qty: 30 TABLET | Refills: 0 | Status: SHIPPED | OUTPATIENT
Start: 2019-03-31

## 2019-03-31 ASSESSMENT — MIFFLIN-ST. JEOR: SCORE: 1233.75

## 2019-03-31 NOTE — ED AVS SNAPSHOT
[facility], Emergency Department  [address]  [facility] [place] [place] [address]  Phone:  [phone]  Fax:  [phone]                                    Cindi Aguayo   MRN: [id number]    Department:  [facility], Emergency Department   Date of Visit:  3/31/2019           After Visit Summary Signature Page    I have received my discharge instructions, and my questions have been answered. I have discussed any challenges I see with this plan with the nurse or doctor.    ..........................................................................................................................................  Patient/Patient Representative Signature      ..........................................................................................................................................  Patient Representative Print Name and Relationship to Patient    ..................................................               ................................................  Date                                   Time    ..........................................................................................................................................  Reviewed by Signature/Title    ...................................................              ..............................................  Date                                               Time          22EPIC Rev 08/18

## 2019-03-31 NOTE — DISCHARGE INSTRUCTIONS
Please make an appointment to follow up with or primary care provider or the [facility] (phone: [phone] in 3-10 days.     Return to the ED if you are having weakness, loss of bowel/bladder control, fever, worsening symptoms, or any urgent/life-threatening concerns.

## 2019-03-31 NOTE — ED TRIAGE NOTES
1 week PTC, pt reported back pain radiating to right leg. Pain started after completing dose of unknown medication for flu

## 2019-03-31 NOTE — ED PROVIDER NOTES
"  History     Chief Complaint   Patient presents with     Back Pain     right back pain radiating to lower leg, denies injuries, no swelling noted, denies dysuria     Leg Pain     right leg, back of the knee pain     \[facility] [place] [facility]\""  Cindi Aguayo is a 43 year old female without pertinent PMH who presents to the ED with right sided back pain. Symptoms started 1 week ago. Pain on right side of low back. She had a fall a few days prior to the pain starting but no pain with the fall. No weakness, no numbness. No dysuria, no urgency, no frequency. No incontinence, no saddle anesthesia. Patient without history of malignancy or IV drug use.     Patient also notes right knee pain for one week. No trauma. Patient points just superior and lateral of the right knee as location. Worse with walking. Aching quality. Pain makes sleeping difficult.     I have reviewed the Medications, Allergies, Past Medical and Surgical History, and Social History in the Epic system.    Review of Systems  A complete review of systems was performed with pertinent positives and negatives noted in the HPI, and all other systems negative.     Physical Exam   BP: (!) 141/97  Pulse: 85  Temp: 97.8  F (36.6  C)  Resp: 18  Height: 162.6 cm (5' 4\")  Weight: 59.9 kg (132 lb)  SpO2: 100 %    Physical Exam  General: no acute distress. Appears stated age.   HENT: MMM, no oropharyngeal lesions  Eyes: PERRL, normal sclerae  Neck: non-tender, supple  Cardio: regular rate. Regular rhythm. Extremities well perfused  Resp: Normal work of breathing, clear breath sounds  Chest/Back: no visual signs of trauma, no CVA tenderness. No midline tenderness. Right lumbar paraspinal tenderness present.   Abdomen: no tenderness, non-distended, no rebound, no guarding  Neuro: alert and fully oriented. CN II-XII grossly intact. Grossly normal strength and sensation in upper extremities. Strength left: 5/5 hip flexion, 5/5 knee flexion, 5/5 knee extension, 5/5 ankle plantarflexion, 5/5 " ankle dorsiflexion. Strength right: 5/5 hip flexion, 5/5 knee flexion, 5/5 knee extension, 5/5 ankle plantarflexion, 5/5 ankle dorsiflexion. Sensation intact to soft touch throughout lower extremities. 2+ Achilles and patellar reflexes. Normal tone, no clonus. Normal standard and inline gait.    MSK: no deformities. Straight leg raise negative left, negative right. Right knee: Patient has point tenderness over the IT band. Mild lateral joint line tenderness, no medial joint line tenderness; intact ACL/PCL/LCL/MCL to testing; Dione negative; no effusion, no warmth.   Integumentary/Skin: no rash visualized, normal color  Psych: normal affect, normal behavior    ED Course      Procedures        Critical Care time:  none         Labs Ordered and Resulted from Time of ED Arrival Up to the Time of Departure from the ED - No data to display         Assessments & Plan (with Medical Decision Making)   Patient presenting with back pain. Vitals in the ED unremarkable. Initial differential diagnosis includes but not limited to muscle spasm, radiculopathy, sciatica, fracture.     No urinary symptoms to suggest UTI/pyelonephritis. No history of malignancy to suggest spinal bony lesions. No history of IV drug use nor fever to suggest epidural abscess. No saddle anesthesia nor incontinence to suggest cauda equina syndrome. Exam with lumbar paraspinal tenderness suggestive of muscle strain vs spasm.     For the patient's knee area pain, there was no trauma nor bony tenderness to suggest fracture. Ligaments intact to testing. IT band palpated and reproduced the patient's pain.     The complete clinical picture is most consistent with right lumbar paraspinal strain and right IT band tendonitis. After counseling on the diagnosis, work-up, and treatment plan, the patient was discharged to home. Naproxen, lidocaine patches prescription provided. The patient was advised to follow-up with primary care in a few days if pain continues.  The patient was advised to return to the ED if worsening symptoms, weakness, incontinence, fever, or if there are any urgent/life-threatening concerns.       Clinical Impression:  Acute back pain, likely lumbar paraspinal spasm  Right IT band tendonitis       Arjun Cunha MD  Emergency Medicine     I have reviewed the nursing notes.  I have reviewed the findings, diagnosis, plan and need for follow up with the patient.  Current Discharge Medication List          Final diagnoses:   Strain of lumbar paraspinal muscle, initial encounter   It band syndrome, right       3/31/2019   [facility], [place], EMERGENCY DEPARTMENT       Arjun Cunha MD  03/31/19 0328

## 2019-04-02 ENCOUNTER — OFFICE VISIT (OUTPATIENT)
Dept: FAMILY MEDICINE | Facility: CLINIC | Age: 44
End: 2019-04-02
Payer: COMMERCIAL

## 2019-04-02 VITALS
DIASTOLIC BLOOD PRESSURE: 66 MMHG | RESPIRATION RATE: 18 BRPM | SYSTOLIC BLOOD PRESSURE: 99 MMHG | BODY MASS INDEX: 22.31 KG/M2 | WEIGHT: 130 LBS | TEMPERATURE: 98.1 F | OXYGEN SATURATION: 99 % | HEART RATE: 79 BPM

## 2019-04-02 DIAGNOSIS — M54.41 ACUTE BILATERAL LOW BACK PAIN WITH BILATERAL SCIATICA: Primary | ICD-10-CM

## 2019-04-02 DIAGNOSIS — M54.42 ACUTE BILATERAL LOW BACK PAIN WITH BILATERAL SCIATICA: Primary | ICD-10-CM

## 2019-04-02 PROCEDURE — 99213 OFFICE O/P EST LOW 20 MIN: CPT | Performed by: NURSE PRACTITIONER

## 2019-04-02 RX ORDER — CYCLOBENZAPRINE HCL 10 MG
10 TABLET ORAL
Qty: 30 TABLET | Refills: 0 | Status: SHIPPED | OUTPATIENT
Start: 2019-04-02

## 2019-04-02 NOTE — PROGRESS NOTES
SUBJECTIVE:   Cindi Aguayo is a 43 year old female who presents to clinic today for the following health issues:      Musculoskeletal problem/pain      Duration: pt notes leg pain after finished antibiotic for influenza A     Description  Location: first R leg pain. She went to the ER on 3/31. After that she took pain medication and the Leg leg started hurting     Intensity:  severe    Therapies tried and outcome: She picked up the Lidocaine patches: Pt would like instructions on how to use     She then developed pain in her left leg since the ED visit and is now having pain in both legs, radiating to lower leg.   Pain is constant, nothing makes it better or worse.  No paresthesias or weakness.   She was having right low back pain, not as bothersome currently.   Pain is interfering with sleep.           Problem list and histories reviewed & adjusted, as indicated.  Additional history: as documented        Reviewed and updated as needed this visit by clinical staff       Reviewed and updated as needed this visit by Provider         ROS:  CONSTITUTIONAL: NEGATIVE for fever, chills, change in weight  ENT/MOUTH: NEGATIVE for ear, mouth and throat problems  RESP: NEGATIVE for significant cough or SOB  CV: NEGATIVE for chest pain, palpitations or peripheral edema  MUSCULOSKELETAL: see HPI  NEURO: NEGATIVE for weakness, dizziness or paresthesias  PSYCHIATRIC: NEGATIVE for changes in mood or affect    OBJECTIVE:     BP 99/66   Pulse 79   Temp 98.1  F (36.7  C) (Oral)   Resp 18   Wt 59 kg (130 lb)   LMP 01/29/2016   SpO2 99%   BMI 22.31 kg/m    Body mass index is 22.31 kg/m .  GENERAL: healthy, alert and no distress  RESP: lungs clear to auscultation - no rales, rhonchi or wheezes  CV: regular rate and rhythm, normal S1 S2, no S3 or S4, no murmur, click or rub, no peripheral edema and peripheral pulses strong  MS: Back inspection: symmetrical, no spinal curvature  Tenderness: no vertebral tenderness, paraspinal  muscle hypertonicity: lumbar: bilateral, SI joint tenderness - bilateral  Musculoskeletal: ROM: limited - flexion, extension and lateral bending to bilateral  Neuro: Strength: 5/5 lower extremities bilaterally, able to heel walk and toe walk  Sensation: sensation to light touch grossly intact and equal bilaterally  Reflexes: patellar reflex 2/4 bilaterally, achilles reflex 2/4 bilaterally  Straight leg raise: positive left leg  Cross leg raise: negative  PSYCH: mentation appears normal, affect normal/bright        ASSESSMENT/PLAN:             1. Acute bilateral low back pain with bilateral sciatica  Discussed treatment options, such as a course of Prednisone, which she declines.  Will have her start PT.  May take Flexeril PRN.  Discussed the use and indication of this medication as well as potential side effects.   Follow up if symptoms are not improving or worsen.   - cyclobenzaprine (FLEXERIL) 10 MG tablet; Take 1 tablet (10 mg) by mouth nightly as needed for muscle spasms  Dispense: 30 tablet; Refill: 0  - [facility], [facility], AND CHIROPRACTIC REFERRAL; Future        Natasha Isaac NP  [facility]

## 2019-04-04 ENCOUNTER — THERAPY VISIT (OUTPATIENT)
Dept: PHYSICAL THERAPY | Facility: CLINIC | Age: 44
End: 2019-04-04
Attending: NURSE PRACTITIONER
Payer: COMMERCIAL

## 2019-04-04 DIAGNOSIS — M54.41 ACUTE BILATERAL LOW BACK PAIN WITH BILATERAL SCIATICA: ICD-10-CM

## 2019-04-04 DIAGNOSIS — M54.42 ACUTE BILATERAL LOW BACK PAIN WITH BILATERAL SCIATICA: ICD-10-CM

## 2019-04-04 DIAGNOSIS — M54.41 BILATERAL LOW BACK PAIN WITH RIGHT-SIDED SCIATICA: ICD-10-CM

## 2019-04-04 PROCEDURE — 97112 NEUROMUSCULAR REEDUCATION: CPT | Mod: GP | Performed by: PHYSICAL THERAPIST

## 2019-04-04 PROCEDURE — 97530 THERAPEUTIC ACTIVITIES: CPT | Mod: GP | Performed by: PHYSICAL THERAPIST

## 2019-04-04 PROCEDURE — 97161 PT EVAL LOW COMPLEX 20 MIN: CPT | Mod: GP | Performed by: PHYSICAL THERAPIST

## 2019-04-04 NOTE — PROGRESS NOTES
HPI  System  Physical Exam  General   ROS        Lumbar Spine Evaluation    Physical Therapy Initial Examination/Evaluation April 4, 2019   Cindi Aguayo is a 43 year old female referred to physical therapy by  Natasha Isaac for treatment of Acute bilat low back pain with bilat sciatica with Precautions/Restrictions/MD instructions E&T     Therapist Assessment:   Clinical Impression: Pt presents to [facility] with primary complaint of low back pain radiating throughout R side.  Per clinical examination, centralization noted with extension.  Anticipate a possible disc pathology with coughing/sneezing as well as decreased core strength contributing to ongoing back symptoms.   Pt will benefit from skilled physical therapy for trial of directional preference exercise as well as core stabilization program.     Subjective: Pt had the flu a few weeks ago and after taking medications had R sided pain. She went to the emergency room and was given pain meds and pain switched to L side. Pain started after a lot of coughing and sneezing.   DOI/onset: MD order 04/02/2019  Mechanism of injury: Meds after surgery   DOS hysterectomy in 2015-has had chronic back pain since  Related PMH: chronic back pain following hysterectomy  Previous treatment: None   Imaging: NA   Chief Complaint: Low back pain and bilat leg pain with R worse than L    Pain: rest 2-3 /10, activity 6-7/10  Described as: Nerve, booming  Alleviated by: walking Exacerbated by: sleeping, sitting  Progression of symptoms since initial onset: Staying the same Time of day when pain is worse: night    Sleeping: Night is the worst    Social history: 5 children-vaginal deliveries ; Ages 12-18    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    Current HEP/exercise regimen: Walking    Patient's goals are Decrease pain    Other pertinent PMH: No issues General health as reported by patient: Good   Return to MD: prn       Static Posture      Lumbar Spine:  Increased lordosis    Dynamic Movement Screen    Double limb squat observations: Able to perform full squat but hesitant to complete       Trunk Range of Motion  Flexion: 75%  Extension: 75%  Side bending: WNL to the L, 50% to the R   Rotation: WNL bilat       Hip and Knee Strength   MMT Hip Abduction Hip Extension Hip Flexion    Left At least 3 /5 4/5 4/5   Right At least 3 /5 4/5 4/5     Special Tests  Neural tension: + SLR on the R  Dermatomes: WNL  Myotomes: WNL    Assessment/Plan:    Patient is a 43 year old female with lumbar complaints.    Patient has the following significant findings with corresponding treatment plan.                Diagnosis 1:  Acute Low Back Pain with bilat sciatica  Pain -  hot/cold therapy, mechanical traction, manual therapy, self management, education, directional preference exercise and home program  Decreased ROM/flexibility - manual therapy, therapeutic exercise, therapeutic activity and home program  Decreased strength - therapeutic exercise, therapeutic activities and home program  Impaired muscle performance - neuro re-education and home program  Decreased function - therapeutic activities and home program    Therapy Evaluation Codes:   1) History comprised of:   Personal factors that impact the plan of care:      Living environment and Time since onset of symptoms.    Comorbidity factors that impact the plan of care are:      Weakness.     Medications impacting care: Pain.  2) Examination of Body Systems comprised of:   Body structures and functions that impact the plan of care:      Lumbar spine.   Activity limitations that impact the plan of care are:      Cooking, Driving, Lifting, Standing and Sleeping.  3) Clinical presentation characteristics are:   Stable/Uncomplicated.  4) Decision-Making    Low complexity using standardized patient assessment instrument and/or measureable assessment of functional outcome.  Cumulative Therapy  Evaluation is: Low complexity.    Previous and current functional limitations:  (See Goal Flow Sheet for this information)    Short term and Long term goals: (See Goal Flow Sheet for this information)     Communication ability:  Patient appears to be able to clearly communicate and understand verbal and written communication and follow directions correctly.  Treatment Explanation - The following has been discussed with the patient:   RX ordered/plan of care  Anticipated outcomes  Possible risks and side effects  This patient would benefit from PT intervention to resume normal activities.   Rehab potential is good.    Frequency:  1 X week, once daily  Duration:  for 6 weeks  Discharge Plan:  Achieve all LTG.  Independent in home treatment program.  Reach maximal therapeutic benefit.    Please refer to the daily flowsheet for treatment today, total treatment time and time spent performing 1:1 timed codes.

## 2019-04-07 PROBLEM — M54.41 BILATERAL LOW BACK PAIN WITH RIGHT-SIDED SCIATICA: Status: ACTIVE | Noted: 2019-04-07

## 2019-05-29 ENCOUNTER — ANCILLARY PROCEDURE (OUTPATIENT)
Dept: GENERAL RADIOLOGY | Facility: CLINIC | Age: 44
End: 2019-05-29
Attending: PHYSICIAN ASSISTANT
Payer: COMMERCIAL

## 2019-05-29 ENCOUNTER — OFFICE VISIT (OUTPATIENT)
Dept: URGENT CARE | Facility: URGENT CARE | Age: 44
End: 2019-05-29
Payer: COMMERCIAL

## 2019-05-29 VITALS
WEIGHT: 130 LBS | OXYGEN SATURATION: 100 % | SYSTOLIC BLOOD PRESSURE: 124 MMHG | HEART RATE: 70 BPM | TEMPERATURE: 97.7 F | DIASTOLIC BLOOD PRESSURE: 71 MMHG | BODY MASS INDEX: 22.31 KG/M2

## 2019-05-29 DIAGNOSIS — S09.93XA FACIAL INJURY, INITIAL ENCOUNTER: ICD-10-CM

## 2019-05-29 DIAGNOSIS — S09.93XA FACIAL INJURY, INITIAL ENCOUNTER: Primary | ICD-10-CM

## 2019-05-29 PROCEDURE — 70140 X-RAY EXAM OF FACIAL BONES: CPT

## 2019-05-29 PROCEDURE — 99213 OFFICE O/P EST LOW 20 MIN: CPT | Performed by: PHYSICIAN ASSISTANT

## 2019-05-30 NOTE — PROGRESS NOTES
HPI:  Cindi is a 43 yo female who presents for facial injury after tripping on raised lip on [email or web address] yesterday.  Reports fell onto her knees and her face  - striking her left cheek and chin.  Denies LOC.  Reports minimal bleeding but was very painful when it happened and it was also swollen.  She has iced areas and edema lessened.  Has taken no OTC medications for pain.  Reports nose feels fine and no visual or eye movement issues.      ROS:  See HPI      PE:  Vitals & nursing notes reviewed. B/P: 124/71, T: 97.7, P: 70, R: Data Unavailable  Constitutional:  Alert & oriented, well nourished, well-developed, NAD  Head: Minor abrasion to left cheek bone with mild edema that is TTP. Smaller minor abrasion to chin that is TTP. No discharge.   Nose is NTTP.    Eyes:  Perrla, EOMI, conjunctiva:  Pink   Sclera:  Anicteric  Ears:  Canals clear BL, TM pearly BL  Throat:  No erythema, exudates, or edema to postoropharynx    XRAY Facial bones:  No fx appreciated    ASSESSMENT:  (S09.93XA) Facial injury, initial encounter  (primary encounter diagnosis)  Comment: Contusion and mild abrasion.  No fx appreciated.  Mild abrasions.  Plan: XR Facial Bones 1/2 Views       Continue  With ice therapy.  Tylenol or advil for pain & fever PRN.  Keep abrasions clean and dry.  F/U with PCP if sx persist or worsen.

## 2019-11-13 ENCOUNTER — OFFICE VISIT (OUTPATIENT)
Dept: FAMILY MEDICINE | Facility: CLINIC | Age: 44
End: 2019-11-13
Payer: COMMERCIAL

## 2019-11-13 VITALS
TEMPERATURE: 97.1 F | HEART RATE: 75 BPM | DIASTOLIC BLOOD PRESSURE: 75 MMHG | OXYGEN SATURATION: 98 % | SYSTOLIC BLOOD PRESSURE: 105 MMHG

## 2019-11-13 DIAGNOSIS — Z71.84 TRAVEL ADVICE ENCOUNTER: Primary | ICD-10-CM

## 2019-11-13 PROCEDURE — 90734 MENACWYD/MENACWYCRM VACC IM: CPT | Mod: GA | Performed by: NURSE PRACTITIONER

## 2019-11-13 PROCEDURE — 99402 PREV MED CNSL INDIV APPRX 30: CPT | Mod: 25 | Performed by: NURSE PRACTITIONER

## 2019-11-13 PROCEDURE — 90471 IMMUNIZATION ADMIN: CPT | Mod: GA | Performed by: NURSE PRACTITIONER

## 2019-11-13 RX ORDER — AZITHROMYCIN 500 MG/1
500 TABLET, FILM COATED ORAL DAILY
Qty: 3 TABLET | Refills: 0 | Status: SHIPPED | OUTPATIENT
Start: 2019-11-13 | End: 2019-11-16

## 2019-11-13 NOTE — PATIENT INSTRUCTIONS
Today November 13, 2019 you received the    Meningococcal (Menactra) Vaccine      Future Tdap   .    These appointments can be made as a NURSE ONLY visit.    **It is very important for the vaccinations to be given on the scheduled day(s), this helps ensure you receive the full effectiveness of the vaccine.**    Please call [facility] with any questions [phone]    Thank you for visiting [facility]

## 2019-11-13 NOTE — NURSING NOTE
Prior to immunization administration, verified patients identity using patient s name and date of birth. Please see Immunization Activity for additional information.     Screening Questionnaire for Adult Immunization    Are you sick today?   No   Do you have allergies to medications, food, a vaccine component or latex?   No   Have you ever had a serious reaction after receiving a vaccination?   No   Do you have a long-term health problem with heart disease, lung disease, asthma, kidney disease, metabolic disease (e.g. diabetes), anemia, or other blood disorder?   No   Do you have cancer, leukemia, HIV/AIDS, or any other immune system problem?   No   In the past 3 months, have you taken medications that affect  your immune system, such as prednisone, other steroids, or anticancer drugs; drugs for the treatment of rheumatoid arthritis, Crohn s disease, or psoriasis; or have you had radiation treatments?   No   Have you had a seizure, or a brain or other nervous system problem?   No   During the past year, have you received a transfusion of blood or blood     products, or been given immune (gamma) globulin or antiviral drug?   No   For women: Are you pregnant or is there a chance you could become        pregnant during the next month?   No   Have you received any vaccinations in the past 4 weeks?   No     Immunization questionnaire answers were all negative.        Per orders of JOSE Lamb, injection of Menactra given by Preeti Fernandez CMA. Patient instructed to remain in clinic for 15 minutes afterwards, and to report any adverse reaction to me immediately.       Screening performed by Preeti Fernandez CMA on 11/13/2019 at 4:02 PM.

## 2019-11-13 NOTE — PROGRESS NOTES
Nurse Note      Itinerary:  [facility]       Departure Date: 12/29/2019      Return Date: 01/18/2020      Length of Trip 3 weeks    Reason for Travel: Tourism/ [place]         Urban or rural: both      Accommodations: Hotel        IMMUNIZATION HISTORY  Have you received any immunizations within the past 4 weeks?  No  Have you ever fainted from having your blood drawn or from an injection?  No  Have you ever had a fever reaction to vaccination?  No  Have you ever had any bad reaction or side effect from any vaccination?  No  Have you ever had hepatitis A or B vaccine?  yes  Do you live (or work closely) with anyone who has AIDS, an AIDS-like condition, any other immune disorder or who is on chemotherapy for cancer?  No  Do you have a family history of immunodeficiency?  No  Have you received any injection of immune globulin or any blood products during the past 12 months?  No    Patient roomed by       Yoan Richardsonluci is a 44 year old female seen today with daughters for counsultation for international travel to the stated countries.   Patient will be departing in  6 week(s) and  traveling with child(frankie).      Patient itinerary :  will be in the [facility] and [facility] region of  which presents risk for food borne illnesses and motor vehicle accidents. exposure.      Patient's activities will include sightseeing and [place].    Patient's country of birth is [place]    Special medical concerns: none  Pre-travel questionnaire was completed by patient and reviewed by provider.     Vitals: LMP 01/29/2016   BMI= There is no height or weight on file to calculate BMI.    EXAM:  General:  Well-nourished, well-developed in no acute distress.  Appears to be stated age, interacts appropriately and expresses understanding of information given to patient.    Current Outpatient Medications   Medication Sig Dispense Refill     cyclobenzaprine (FLEXERIL) 10 MG tablet Take 1 tablet (10 mg) by mouth nightly as needed  for muscle spasms (Patient not taking: Reported on 5/29/2019) 30 tablet 0     ibuprofen (ADVIL/MOTRIN) 200 MG tablet Take 200 mg by mouth every 4 hours as needed for mild pain       Lidocaine (LIDOCARE) 4 % Patch Place 1 patch onto the skin every 24 hours (Patient not taking: Reported on 5/29/2019) 12 patch 0     naproxen (NAPROSYN) 250 MG tablet Take 1 tablet (250 mg) by mouth 2 times daily as needed for moderate pain (Patient not taking: Reported on 5/29/2019) 30 tablet 0     Patient Active Problem List   Diagnosis     Contraception     Esophageal reflux     CARDIOVASCULAR SCREENING; LDL GOAL LESS THAN 160     Bilateral low back pain with right-sided sciatica     Allergies   Allergen Reactions     No Known Drug Allergies          Immunizations discussed include:   Hepatitis A:  Deferred, refused  Hepatitis B: deferred refuesed  Influenza: refused  Typhoid: Not indicated  Rabies: Not indicated  Yellow Fever: Not indicated  Japanese Encephalitis: Not indicated  Meningococcus: Ordered/given today, risks, benefits and side effects reviewed  Tetanus/Diphtheria: fuutre order placed  Measles/Mumps/Rubella: Up to date  Cholera: Not needed  Polio: Up to date  Pneumococcal: Under age of 65  Varicella: Immune by disease history per patient report  Zostavax:  Not indicated  Shingrix: Not indicated  HPV:  Not indicated  TB:  Low risk     Altitude Exposure on this trip: no  Past tolerance to Altitude: na    ASSESSMENT/PLAN:    ICD-10-CM    1. Travel advice encounter Z71.84 azithromycin (ZITHROMAX) 500 MG tablet     I have reviewed general recommendations for safe travel   including: food/water precautions, insect precautions, safer sex   practices given high prevalence of Zika, HIV and other STDs,   roadway safety. Educational materials and [employer] report provided.    Malaraia prophylaxis recommended: none  Symptomatic treatment for traveler's diarrhea: azithromycin  Altitude illness prevention and treatment:  none      Evacuation insurance advised and resources were provided to patient.    Total visit time 30 minutes  with over 50% of time spent counseling patient as detailed above.    Telma Lamb CNP

## 2019-12-01 ENCOUNTER — NURSE TRIAGE (OUTPATIENT)
Dept: NURSING | Facility: CLINIC | Age: 44
End: 2019-12-01

## 2019-12-01 NOTE — TELEPHONE ENCOUNTER
She has had the flu for 5 days, her nose is burning and her eye's are watering, from the pain in her nose. She is going to try afrin nasal spray and a allergy medication and see if that helps. Call back with any changes.    Dang Ramires RN/ [facility]         Additional Information    Negative: Severe difficulty breathing (e.g., struggling for each breath, speaks in single words)    Negative: Sounds like a life-threatening emergency to the triager    Runny nose is caused by pollen or other allergies    Negative: Patient sounds very sick or weak to the triager    Negative: Lots of coughing    Negative: [1] Lots of yellow or green discharge from nose AND [2] present > 3 days    Negative: [1] Taking antihistamines > 2 days AND [2] nasal allergy symptoms interfere with sleep, school, or work    Negative: [1] Nasal allergies AND [2] only certain times of year AND [3] hay fever diagnosis has never been confirmed by a HCP    Negative: [1] Nasal allergies AND [2] year-round symptoms    Negative: Snores most nights of month    [1] Nasal allergies AND [2] only certain times of year (hay fever)    Protocols used: COMMON COLD-A-AH, NASAL ALLERGIES (HAY FEVER)-A-AH

## 2020-02-24 ENCOUNTER — OFFICE VISIT (OUTPATIENT)
Dept: URGENT CARE | Facility: URGENT CARE | Age: 45
End: 2020-02-24
Payer: COMMERCIAL

## 2020-02-24 VITALS
HEIGHT: 64 IN | DIASTOLIC BLOOD PRESSURE: 73 MMHG | BODY MASS INDEX: 22.31 KG/M2 | SYSTOLIC BLOOD PRESSURE: 105 MMHG | OXYGEN SATURATION: 100 % | HEART RATE: 102 BPM | TEMPERATURE: 97.7 F

## 2020-02-24 DIAGNOSIS — R30.0 DYSURIA: ICD-10-CM

## 2020-02-24 DIAGNOSIS — N30.01 ACUTE CYSTITIS WITH HEMATURIA: Primary | ICD-10-CM

## 2020-02-24 LAB
ALBUMIN UR-MCNC: NEGATIVE MG/DL
APPEARANCE UR: CLEAR
BACTERIA #/AREA URNS HPF: ABNORMAL /HPF
BILIRUB UR QL STRIP: NEGATIVE
COLOR UR AUTO: YELLOW
GLUCOSE UR STRIP-MCNC: NEGATIVE MG/DL
HGB UR QL STRIP: ABNORMAL
KETONES UR STRIP-MCNC: NEGATIVE MG/DL
LEUKOCYTE ESTERASE UR QL STRIP: ABNORMAL
NITRATE UR QL: NEGATIVE
PH UR STRIP: 7 PH (ref 5–7)
RBC #/AREA URNS AUTO: ABNORMAL /HPF
SOURCE: ABNORMAL
SP GR UR STRIP: 1.01 (ref 1–1.03)
UROBILINOGEN UR STRIP-ACNC: 0.2 EU/DL (ref 0.2–1)
WBC #/AREA URNS AUTO: ABNORMAL /HPF

## 2020-02-24 PROCEDURE — 81001 URINALYSIS AUTO W/SCOPE: CPT | Performed by: INTERNAL MEDICINE

## 2020-02-24 PROCEDURE — 99213 OFFICE O/P EST LOW 20 MIN: CPT | Performed by: PHYSICIAN ASSISTANT

## 2020-02-24 RX ORDER — NITROFURANTOIN 25; 75 MG/1; MG/1
100 CAPSULE ORAL 2 TIMES DAILY
Qty: 14 CAPSULE | Refills: 0 | Status: SHIPPED | OUTPATIENT
Start: 2020-02-24 | End: 2020-03-02

## 2020-02-24 ASSESSMENT — ENCOUNTER SYMPTOMS
BACK PAIN: 0
FREQUENCY: 1
FEVER: 0
DYSURIA: 1

## 2020-02-25 NOTE — PROGRESS NOTES
SUBJECTIVE:   Cindi Aguayo is a 44 year old female presenting with a chief complaint of   Chief Complaint   Patient presents with     Urgent Care     Pt in clinic to have eval for dysuria.     Dysuria       She is an established patient of [facility].    UTI    Onset of symptoms was 1week(s).  Course of illness is same  Severity mild  Current and associated symptoms dysuria, frequency and urgency  Treatment and measures tried None  Predisposing factors include none  Patient denies rigors, flank pain, temperature > 101 degrees F., vomiting and taking Coumadin            Review of Systems   Constitutional: Negative for fever.   Genitourinary: Positive for dysuria and frequency.   Musculoskeletal: Negative for back pain.   All other systems reviewed and are negative.      Past Medical History:   Diagnosis Date     Anorexia      Fibroids, intramural 1/2015    2 x 2 cm impinging on cavity uterus     Nausea and vomiting      Sleep deprivation      Weight loss      Family History   Problem Relation Age of Onset     Family History Negative No family hx of      Current Outpatient Medications   Medication Sig Dispense Refill     nitroFURantoin macrocrystal-monohydrate (MACROBID) 100 MG capsule Take 1 capsule (100 mg) by mouth 2 times daily for 7 days 14 capsule 0     cyclobenzaprine (FLEXERIL) 10 MG tablet Take 1 tablet (10 mg) by mouth nightly as needed for muscle spasms (Patient not taking: Reported on 5/29/2019) 30 tablet 0     ibuprofen (ADVIL/MOTRIN) 200 MG tablet Take 200 mg by mouth every 4 hours as needed for mild pain       Lidocaine (LIDOCARE) 4 % Patch Place 1 patch onto the skin every 24 hours (Patient not taking: Reported on 5/29/2019) 12 patch 0     naproxen (NAPROSYN) 250 MG tablet Take 1 tablet (250 mg) by mouth 2 times daily as needed for moderate pain (Patient not taking: Reported on 5/29/2019) 30 tablet 0     Social History     Tobacco Use     Smoking status: Never Smoker     Smokeless tobacco: Never Used  "  Substance Use Topics     Alcohol use: No     Alcohol/week: 0.0 standard drinks       OBJECTIVE  /73   Pulse 102   Temp 97.7  F (36.5  C) (Oral)   Ht 1.626 m (5' 4\")   LMP 01/29/2016   SpO2 100%   BMI 22.31 kg/m      Physical Exam  Vitals signs and nursing note reviewed.   Constitutional:       Appearance: Normal appearance. She is normal weight.   Eyes:      Extraocular Movements: Extraocular movements intact.      Conjunctiva/sclera: Conjunctivae normal.   Cardiovascular:      Rate and Rhythm: Normal rate and regular rhythm.      Pulses: Normal pulses.      Heart sounds: Normal heart sounds.   Pulmonary:      Effort: Pulmonary effort is normal.      Breath sounds: Normal breath sounds.   Abdominal:      Tenderness: There is no right CVA tenderness or left CVA tenderness.   Skin:     General: Skin is warm and dry.      Capillary Refill: Capillary refill takes less than 2 seconds.   Neurological:      General: No focal deficit present.      Mental Status: She is alert.   Psychiatric:         Mood and Affect: Mood normal.         Labs:  Results for orders placed or performed in visit on 02/24/20 (from the past 24 hour(s))   *UA reflex to Microscopic and Culture ([place] and [facility] (except [place] and [place])   Result Value Ref Range    Color Urine Yellow     Appearance Urine Clear     Glucose Urine Negative NEG^Negative mg/dL    Bilirubin Urine Negative NEG^Negative    Ketones Urine Negative NEG^Negative mg/dL    Specific Gravity Urine 1.015 1.003 - 1.035    Blood Urine Trace (A) NEG^Negative    pH Urine 7.0 5.0 - 7.0 pH    Protein Albumin Urine Negative NEG^Negative mg/dL    Urobilinogen Urine 0.2 0.2 - 1.0 EU/dL    Nitrite Urine Negative NEG^Negative    Leukocyte Esterase Urine Small (A) NEG^Negative    Source Midstream Urine    Urine Microscopic   Result Value Ref Range    WBC Urine 0 - 5 OTO5^0 - 5 /HPF    RBC Urine O - 2 OTO2^O - 2 /HPF    Bacteria Urine Many (A) NEG^Negative /HPF "       X-Ray was not done.    ASSESSMENT:      ICD-10-CM    1. Acute cystitis with hematuria N30.01 nitroFURantoin macrocrystal-monohydrate (MACROBID) 100 MG capsule   2. Dysuria R30.0 *UA reflex to Microscopic and Culture ([place] and [facility] (except [place] and [place])     Urine Microscopic        Medical Decision Making:    Differential Diagnosis:  UTI: UTI    Serious Comorbid Conditions:  Adult:  None    PLAN:    UTI Adult:  macrobid    Followup:    If not improving or if condition worsens, follow up with your Primary Care Provider, If not improving or if conditions worsens over the next 12-24 hours, go to the Emergency Department    Patient Instructions       Patient Education     Understanding Urinary Tract Infections (UTIs)  Most UTIs are caused by bacteria, although they may also be caused by viruses or fungi. Bacteria from the bowel are the most common source of infection. The infection may start because of any of the following:    Sexual activity. During sex, bacteria can travel from the penis, vagina, or rectum into the urethra.     Bacteria on the skin outside the rectum may travel into the urethra. This is more common in women since the rectum and urethra are closer to each other than in men. Wiping from front to back after using the toilet and keeping the area clean can help prevent germs from getting to the urethra.    Blockage of urine flow through the urinary tract. If urine sits too long, germs may start to grow out of control.      Parts of the urinary tract  The infection can occur in any part of the urinary tract.    The kidneys collect and store urine.    The ureters carry urine from the kidneys to the bladder.    The bladder holds urine until you are ready to let it out.    The urethra carries urine from the bladder out of the body. It is shorter in women, so bacteria can move through it more easily. The urethra is longer in men, so a UTI is less likely to reach the bladder or  kidneys in men.  Date Last Reviewed: 1/1/2017 2000-2019 [employer]. [address] [place] [address], [place] [address]. All rights reserved. This information is not intended as a substitute for professional medical care. Always follow your healthcare professional's instructions.

## 2020-02-25 NOTE — PATIENT INSTRUCTIONS
Patient Education     Understanding Urinary Tract Infections (UTIs)  Most UTIs are caused by bacteria, although they may also be caused by viruses or fungi. Bacteria from the bowel are the most common source of infection. The infection may start because of any of the following:    Sexual activity. During sex, bacteria can travel from the penis, vagina, or rectum into the urethra.     Bacteria on the skin outside the rectum may travel into the urethra. This is more common in women since the rectum and urethra are closer to each other than in men. Wiping from front to back after using the toilet and keeping the area clean can help prevent germs from getting to the urethra.    Blockage of urine flow through the urinary tract. If urine sits too long, germs may start to grow out of control.      Parts of the urinary tract  The infection can occur in any part of the urinary tract.    The kidneys collect and store urine.    The ureters carry urine from the kidneys to the bladder.    The bladder holds urine until you are ready to let it out.    The urethra carries urine from the bladder out of the body. It is shorter in women, so bacteria can move through it more easily. The urethra is longer in men, so a UTI is less likely to reach the bladder or kidneys in men.  Date Last Reviewed: 1/1/2017 2000-2019 [employer] [address] [place] [address], [place] [address]. All rights reserved. This information is not intended as a substitute for professional medical care. Always follow your healthcare professional's instructions.

## 2020-11-12 ENCOUNTER — NURSE TRIAGE (OUTPATIENT)
Dept: NURSING | Facility: CLINIC | Age: 45
End: 2020-11-12

## 2020-11-13 NOTE — TELEPHONE ENCOUNTER
"Cindi has a question about taking omeprazole.  She took her first dose a couple hours ago and wants to know if it's OK for her to eat or drink.    She decided to try omeprazole because of symptoms she has noticed for the past couple weeks. When she eats certain foods - spaghetti sauce or cream cheese - she'll get a sharp, \"knife-like\" feeling in her upper chest. It only happens when she eats certain foods    Advised that omeprazole can be taken with or without food and should be taken about the same time every day. Suggested first thing in the morning, before eating.    Advised to follow up with PCP.    Randi Mcclain RN  [facility]      Additional Information    Caller has medication question, adult has minor symptoms, caller declines triage, and triager answers question    Protocols used: MEDICATION QUESTION CALL-A-AH      "

## 2021-06-04 ENCOUNTER — NURSE TRIAGE (OUTPATIENT)
Dept: NURSING | Facility: CLINIC | Age: 46
End: 2021-06-04

## 2021-06-04 ENCOUNTER — OFFICE VISIT (OUTPATIENT)
Dept: URGENT CARE | Facility: URGENT CARE | Age: 46
End: 2021-06-04
Payer: COMMERCIAL

## 2021-06-04 VITALS
HEART RATE: 64 BPM | OXYGEN SATURATION: 100 % | BODY MASS INDEX: 22.31 KG/M2 | SYSTOLIC BLOOD PRESSURE: 107 MMHG | DIASTOLIC BLOOD PRESSURE: 74 MMHG | WEIGHT: 130 LBS | TEMPERATURE: 98.2 F

## 2021-06-04 DIAGNOSIS — L50.9 URTICARIA: Primary | ICD-10-CM

## 2021-06-04 DIAGNOSIS — L08.9 LOCAL INFECTION OF SKIN AND SUBCUTANEOUS TISSUE: ICD-10-CM

## 2021-06-04 PROCEDURE — 99214 OFFICE O/P EST MOD 30 MIN: CPT | Performed by: FAMILY MEDICINE

## 2021-06-04 RX ORDER — MUPIROCIN 20 MG/G
OINTMENT TOPICAL 3 TIMES DAILY
Qty: 22 G | Refills: 0 | Status: SHIPPED | OUTPATIENT
Start: 2021-06-04

## 2021-06-04 RX ORDER — CEPHALEXIN 500 MG/1
500 CAPSULE ORAL 2 TIMES DAILY
Qty: 20 CAPSULE | Refills: 0 | Status: SHIPPED | OUTPATIENT
Start: 2021-06-04 | End: 2021-06-14

## 2021-06-04 RX ORDER — PREDNISONE 20 MG/1
40 TABLET ORAL DAILY
Qty: 10 TABLET | Refills: 0 | Status: SHIPPED | OUTPATIENT
Start: 2021-06-04 | End: 2021-06-09

## 2021-06-04 RX ORDER — TRIAMCINOLONE ACETONIDE 1 MG/G
CREAM TOPICAL 2 TIMES DAILY
Qty: 30 G | Refills: 0 | Status: SHIPPED | OUTPATIENT
Start: 2021-06-04

## 2021-06-04 NOTE — PROGRESS NOTES
Patient presents with:  Urgent Care  Leg Pain: c/o leg pain for 3 days , no injury       Subjective     Cindi Aguayo is a 46 year old female who presents to clinic today for the following health issues:    HPI   Rash  Onset/Duration: 3 days ago   Description  Location: lower legs   Character: pruritic, starts as a red raised area.that is prurtic,  scratched at rash and increased in size/looks more like a bruise  One on back of heel on left leg fluid filled vesicle.   Itching: moderate  Intensity:  moderate  Progression of Symptoms:  Worsening- legs aching last night in areas of rash   Accompanying signs and symptoms:   Fever: no  Body aches or joint pain: no-just in area of rash, unable to sleep due to pain   Sore throat symptoms: no  Recent cold symptoms: no  History:           Previous episodes of similar rash: has sensitive skin in the past-similar reaction to denise, has similar reaction when walking in the cold   New exposures:  Walking in grass 3 days ago, did not do weed killer this year  Recent travel: no  Exposure to similar rash: yes as noted, no other family with same  Precipitating or alleviating factors: unknown  Therapies tried and outcome: hydrocortisone cream -  Helped for itching, but then rash came back again, vaseline lotion with coconut, made it worse     Covid vaccine -NO        Patient Active Problem List   Diagnosis     Contraception     Esophageal reflux     CARDIOVASCULAR SCREENING; LDL GOAL LESS THAN 160     Bilateral low back pain with right-sided sciatica     Past Surgical History:   Procedure Laterality Date     C IUD,MIRENA  1/13/15-3/30/16    removed for bleeding/discharge     ESOPHAGOSCOPY, GASTROSCOPY, DUODENOSCOPY (EGD), COMBINED Left 11/23/2015    Procedure: COMBINED ESOPHAGOSCOPY, GASTROSCOPY, DUODENOSCOPY (EGD), BIOPSY SINGLE OR MULTIPLE;  Surgeon: Brendan Tony MD;  Location: [facility]     NO HISTORY OF SURGERY         Social History     Tobacco Use     Smoking status:  Never Smoker     Smokeless tobacco: Never Used   Substance Use Topics     Alcohol use: No     Alcohol/week: 0.0 standard drinks     Family History   Problem Relation Age of Onset     Family History Negative No family hx of            Current Outpatient Medications   Medication Sig Dispense Refill     cephALEXin (KEFLEX) 500 MG capsule Take 1 capsule (500 mg) by mouth 2 times daily for 10 days 20 capsule 0     mupirocin (BACTROBAN) 2 % external ointment Apply topically 3 times daily 22 g 0     predniSONE (DELTASONE) 20 MG tablet Take 2 tablets (40 mg) by mouth daily for 5 days 10 tablet 0     triamcinolone (KENALOG) 0.1 % external cream Apply topically 2 times daily 30 g 0     cyclobenzaprine (FLEXERIL) 10 MG tablet Take 1 tablet (10 mg) by mouth nightly as needed for muscle spasms (Patient not taking: Reported on 5/29/2019) 30 tablet 0     ibuprofen (ADVIL/MOTRIN) 200 MG tablet Take 200 mg by mouth every 4 hours as needed for mild pain       Lidocaine (LIDOCARE) 4 % Patch Place 1 patch onto the skin every 24 hours (Patient not taking: Reported on 5/29/2019) 12 patch 0     naproxen (NAPROSYN) 250 MG tablet Take 1 tablet (250 mg) by mouth 2 times daily as needed for moderate pain (Patient not taking: Reported on 5/29/2019) 30 tablet 0     Allergies   Allergen Reactions     No Known Drug Allergies              ROS are negative, except as otherwise noted HPI      Objective    /74   Pulse 64   Temp 98.2  F (36.8  C) (Tympanic)   Wt 59 kg (130 lb)   LMP 01/29/2016   SpO2 100%   BMI 22.31 kg/m    Body mass index is 22.31 kg/m .  Physical Exam   GENERAL: healthy, alert and no distress  MS: no gross musculoskeletal defects noted, no pretibial edema, no posterior calf tenderness  SKIN: a few scattered urticarial lesions on lower legs near ankles and on r foot,   Left foot lataeral foot soft tissue of lateral ankle swollen tender,mild erythema no warmth, no fluctuance,clear  fluid filled vesicle on back of heel,  nontender, no erythema  NEURO: Normal strength and tone, mentation intact and speech normal      Diagnostic Test Results:  Labs reviewed in Epic  No results found for any visits on 06/04/21.        ASSESSMENT/PLAN:      ICD-10-CM    1. Urticaria  L50.9 predniSONE (DELTASONE) 20 MG tablet     triamcinolone (KENALOG) 0.1 % external cream   2. Local infection of skin and subcutaneous tissue  L08.9 cephALEXin (KEFLEX) 500 MG capsule     mupirocin (BACTROBAN) 2 % external ointment         Patient Instructions     Start prednisone 2 tablets daily for 5 days for the allergic reaction-take during the day-ok to take now, then tomorrow in am, do not take at night, will keep you awake    Start cephalexin-antibiotic  for the tender, swelling/early skin infection/blister on lower leg/foot  2 times a day for 10 days    Use mupirocin ointment 3 times a day on areas that are infected    Use triamcinolone cream-stronger anti-itch cream to areas that itch    You can take benadryl over the counter pill -one at night for itching, will help you sleep as well    Take ibuprofen and tylenol for pain    If fever, rash is worse, to ER       Patient Education     Hives (Adult)  Hives are pink or red bumps on the skin. These bumps are also known as wheals. The bumps can itch, burn, or sting. Hives can occur anywhere on the body. They vary in size and shape and can form in clusters. Individual hives can appear and go away quickly. New hives may develop as old ones fade. Hives are common and usually harmless. They are not contagious. Occasionally, hives are a sign of a serious allergy.   Hives are often caused by an allergic reaction. They may occur from:     Certain foods, such as shellfish, nuts, tomatoes, or berries    Contact with something in the environment, such as pollens, animals, or mold    Certain medicines    Sun or cold air    Viral infections, such as a cold, the flu, or strep throat  If the hives continue to come and go over many  weeks without any other symptoms (chronic hives), the cause may be very hard to figure out.   You may be prescribed medicines to ease swelling and itching. Follow all instructions when using these medicines. The hives will usually fade in a few days. But they can last for weeks or months.   Home care   Follow these tips:    Try to find the cause of the hives and eliminate it. Discuss possible causes with your healthcare provider. Your healthcare provider may ask you to keep track of the food you eat and your lifestyle to help find the cause of the hives.    Don t scratch the hives. Scratching will delay healing. To reduce itching, apply cool, wet compresses to the skin.    Dress in soft, loose cotton clothing.    Don t bathe in hot water. This can make the itching worse.    Apply an ice pack or cool pack wrapped in a thin towel to your skin. This will help reduce redness and itching. But if your hives were caused by exposure to cold, then do not apply more cold to them.    You may use over-the counter antihistamines to reduce itching. Some older antihistamines, such as diphenhydramine and chlorpheniramine, are inexpensive. But they need to be taken often and may make you sleepy. They are best used at bedtime. Don t use diphenhydramine if you have glaucoma or have trouble urinating because of an enlarged prostate. Newer antihistamines, such as loratadine, cetirizine, levocetirizine, and fexofenadine, are generally more expensive. But they tend to have fewer side effects. They can be taken less often.    Another type of antihistamine is used to treat heartburn. This type includes nizatidine, famotidine, and cimetidine. These are sometimes used along with the above antihistamines if a single medicine is not working.    If the hives are severe and you do not respond well to other medicines, you may be given a steroid, such as prednisone, to take for a short time. Follow all instructions carefully when taking this  medicine. Tell your healthcare provider about any side effects.  Follow-up care   Follow up with your healthcare provider if your symptoms don't get better in 2 days. Ask your provider about allergy testing if you have had a severe reaction or have had several episodes of hives. Allergy testing may help figure out what you are allergic to. You may need blood tests, a urine test, or skin tests.   When to seek medical advice   Call your healthcare provider right away if any of these occur:     Fever of 100.4 F (38.0 C) or higher, or as directed by your healthcare provider    Redness, swelling, or pain    Foul-smelling fluid coming from the rash  Call 911  Call 911 if any of the following occur:     Swelling of the face, throat, or tongue    Trouble breathing or swallowing    Dizziness, weakness, or fainting  [employer] last reviewed this educational content on 6/1/2019 2000-2021 [employer]. All rights reserved. This information is not intended as a substitute for professional medical care. Always follow your healthcare professional's instructions.           Patient Education     Skin infection/Cellulitis   Cellulitis is another name for an infection in the skin . A break in the skin, such as a cut or scratch, can let bacteria under the skin. If the bacteria get to deep layers of the skin, it can be serious. If not treated, cellulitis can get into the bloodstream and lymph nodes. The infection can then spread throughout the body. This causes serious illness.   Cellulitis causes the affected skin to become red, swollen, warm, and sore. The reddened areas have a visible border. An open sore may leak fluid (pus). You may have a fever, chills, and pain.   Cellulitis is treated with antibiotics taken for 7 to 10 days. An open sore may be cleaned and covered with cool wet gauze. Symptoms should get better 1 to 2 days after treatment is started. Make sure to take all the antibiotics for the full number of days  until they are gone. Keep taking the medicine even if your symptoms go away.   Home care  Follow these tips:    Limit the use of the part of your body with cellulitis.     If the infection is on your leg, keep your leg raised while sitting. This helps reduce swelling.    Take all of the antibiotic medicine exactly as directed until it is gone. Don't miss any doses, especially during the first 7 days. Don t stop taking the medicine when your symptoms get better.    Keep the affected area clean and dry.    Wash your hands with soap and clean, running water before and after touching your skin. Anyone else who touches your skin should also wash his or her hands. Don't share towels.  Follow-up care  Follow up with your healthcare provider, or as advised. If your infection doesn't go away on the first antibiotic, your healthcare provider will prescribe a different one.   When to seek medical advice  Call your healthcare provider right away if any of these occur:    Red areas that spread    Swelling or pain that gets worse    Fluid leaking from the skin (pus)    Fever higher of 100.4  F (38.0  C) or higher after 2 days on antibiotics  [employer] last reviewed this educational content on 8/1/2019 2000-2021 [employer]. All rights reserved. This information is not intended as a substitute for professional medical care. Always follow your healthcare professional's instructions.                      Reviewed medication instructions and side effects. Follow up if experiences side effects.     I reviewed supportive care, otc meds to use if needed, expected course, and signs of concern.  Follow up as needed or if she does not improve within 1-2 day(s) or if worsens in any way.  Reviewed red flag symptoms and is to go to the ER if experiences any of these.

## 2021-06-04 NOTE — PATIENT INSTRUCTIONS
Start prednisone 2 tablets daily for 5 days for the allergic reaction-take during the day-ok to take now, then tomorrow in am, do not take at night, will keep you awake    Start cephalexin-antibiotic  for the tender, swelling/early skin infection/blister on lower leg/foot  2 times a day for 10 days    Use mupirocin ointment 3 times a day on areas that are infected    Use triamcinolone cream-stronger anti-itch cream to areas that itch    You can take benadryl over the counter pill -one at night for itching, will help you sleep as well    Take ibuprofen and tylenol for pain    If fever, rash is worse, to ER       Patient Education     Hives (Adult)  Hives are pink or red bumps on the skin. These bumps are also known as wheals. The bumps can itch, burn, or sting. Hives can occur anywhere on the body. They vary in size and shape and can form in clusters. Individual hives can appear and go away quickly. New hives may develop as old ones fade. Hives are common and usually harmless. They are not contagious. Occasionally, hives are a sign of a serious allergy.   Hives are often caused by an allergic reaction. They may occur from:     Certain foods, such as shellfish, nuts, tomatoes, or berries    Contact with something in the environment, such as pollens, animals, or mold    Certain medicines    Sun or cold air    Viral infections, such as a cold, the flu, or strep throat  If the hives continue to come and go over many weeks without any other symptoms (chronic hives), the cause may be very hard to figure out.   You may be prescribed medicines to ease swelling and itching. Follow all instructions when using these medicines. The hives will usually fade in a few days. But they can last for weeks or months.   Home care   Follow these tips:    Try to find the cause of the hives and eliminate it. Discuss possible causes with your healthcare provider. Your healthcare provider may ask you to keep track of the food you eat and  your lifestyle to help find the cause of the hives.    Don t scratch the hives. Scratching will delay healing. To reduce itching, apply cool, wet compresses to the skin.    Dress in soft, loose cotton clothing.    Don t bathe in hot water. This can make the itching worse.    Apply an ice pack or cool pack wrapped in a thin towel to your skin. This will help reduce redness and itching. But if your hives were caused by exposure to cold, then do not apply more cold to them.    You may use over-the counter antihistamines to reduce itching. Some older antihistamines, such as diphenhydramine and chlorpheniramine, are inexpensive. But they need to be taken often and may make you sleepy. They are best used at bedtime. Don t use diphenhydramine if you have glaucoma or have trouble urinating because of an enlarged prostate. Newer antihistamines, such as loratadine, cetirizine, levocetirizine, and fexofenadine, are generally more expensive. But they tend to have fewer side effects. They can be taken less often.    Another type of antihistamine is used to treat heartburn. This type includes nizatidine, famotidine, and cimetidine. These are sometimes used along with the above antihistamines if a single medicine is not working.    If the hives are severe and you do not respond well to other medicines, you may be given a steroid, such as prednisone, to take for a short time. Follow all instructions carefully when taking this medicine. Tell your healthcare provider about any side effects.  Follow-up care   Follow up with your healthcare provider if your symptoms don't get better in 2 days. Ask your provider about allergy testing if you have had a severe reaction or have had several episodes of hives. Allergy testing may help figure out what you are allergic to. You may need blood tests, a urine test, or skin tests.   When to seek medical advice   Call your healthcare provider right away if any of these occur:     Fever of 100.4 F  (38.0 C) or higher, or as directed by your healthcare provider    Redness, swelling, or pain    Foul-smelling fluid coming from the rash  Call 911  Call 911 if any of the following occur:     Swelling of the face, throat, or tongue    Trouble breathing or swallowing    Dizziness, weakness, or fainting  [employer] last reviewed this educational content on 6/1/2019 2000-2021 [employer]. All rights reserved. This information is not intended as a substitute for professional medical care. Always follow your healthcare professional's instructions.           Patient Education     Skin infection/Cellulitis   Cellulitis is another name for an infection in the skin . A break in the skin, such as a cut or scratch, can let bacteria under the skin. If the bacteria get to deep layers of the skin, it can be serious. If not treated, cellulitis can get into the bloodstream and lymph nodes. The infection can then spread throughout the body. This causes serious illness.   Cellulitis causes the affected skin to become red, swollen, warm, and sore. The reddened areas have a visible border. An open sore may leak fluid (pus). You may have a fever, chills, and pain.   Cellulitis is treated with antibiotics taken for 7 to 10 days. An open sore may be cleaned and covered with cool wet gauze. Symptoms should get better 1 to 2 days after treatment is started. Make sure to take all the antibiotics for the full number of days until they are gone. Keep taking the medicine even if your symptoms go away.   Home care  Follow these tips:    Limit the use of the part of your body with cellulitis.     If the infection is on your leg, keep your leg raised while sitting. This helps reduce swelling.    Take all of the antibiotic medicine exactly as directed until it is gone. Don't miss any doses, especially during the first 7 days. Don t stop taking the medicine when your symptoms get better.    Keep the affected area clean and  dry.    Wash your hands with soap and clean, running water before and after touching your skin. Anyone else who touches your skin should also wash his or her hands. Don't share towels.  Follow-up care  Follow up with your healthcare provider, or as advised. If your infection doesn't go away on the first antibiotic, your healthcare provider will prescribe a different one.   When to seek medical advice  Call your healthcare provider right away if any of these occur:    Red areas that spread    Swelling or pain that gets worse    Fluid leaking from the skin (pus)    Fever higher of 100.4  F (38.0  C) or higher after 2 days on antibiotics  Call[employer] last reviewed this educational content on 8/1/2019 2000-2021 [employer]. All rights reserved. This information is not intended as a substitute for professional medical care. Always follow your healthcare professional's instructions.

## 2021-06-04 NOTE — TELEPHONE ENCOUNTER
Pain and itching on legs    Lower legs  Both Legs  Entirety of lower legs is red  Red Rash   Bumps     Painful 10/10  Itching 10/10    No fever    Triaged to a disposition of See a physician within 24 hours. Patient is agreeable. Plans to go to     COVID 19 Nurse Triage Plan/Patient Instructions    Please be aware that novel coronavirus (COVID-19) may be circulating in the community. If you develop symptoms such as fever, cough, or SOB or if you have concerns about the presence of another infection including coronavirus (COVID-19), please contact your health care provider or visit [email or web address].     Disposition/Instructions    In-Person Visit with provider recommended. Reference Visit Selection Guide.    Thank you for taking steps to prevent the spread of this virus.  o Limit your contact with others.  o Wear a simple mask to cover your cough.  o Wash your hands well and often.    Resources    [facility]: About COVID-19: [email or web address]    CDC: What to Do If You're Sick: www.cdc.gov/coronavirus/2019-ncov/about/steps-when-sick.html    CDC: Ending Home Isolation: www.cdc.gov/coronavirus/2019-ncov/hcp/disposition-in-home-patients.html     CDC: Caring for Someone: www.cdc.gov/coronavirus/2019-ncov/if-you-are-sick/care-for-someone.html     [facility]: Interim Guidance for Hospital Discharge to Home: www.health.[email or web address].mn.us/diseases/coronavirus/hcp/hospdischarge.pdf    [facility] clinical trials (COVID-19 research studies): [email or web address]-clinical-trials     Below are the COVID-19 hotlines at the [facility] of Health ([facility]). Interpreters are available.   o For health questions: Call 423-830-5533 or 1-938.148.7985 (7 a.m. to 7 p.m.)  o For questions about schools and childcare: Call 725-671-5670 or 1-200.601.5854 (7 a.m. to 7 p.m.)     Reason for Disposition    [1] Looks infected (spreading redness, pus) AND [2] no fever    Additional Information     Negative: [1] Sudden onset of rash (within last 2 hours) AND [2] difficulty with breathing or swallowing    Negative: Sounds like a life-threatening emergency to the triager    Negative: Poison ivy, oak, or sumac contact suspected    Negative: Insect bite(s) suspected    Negative: Ringworm suspected (i.e., round pink patch, sometimes looks like ring, usually 1/2 to 1 inch [12-25 mm],  in size, slowly increasing in size)    Negative: Athlete's Foot suspected (i.e., itchy rash between the toes)    Negative: Jock Itch suspected (i.e., itchy rash on inner thighs near genital area)    Negative: Wound infection suspected (i.e., pain, spreading redness, or pus; in a cut, puncture, scrape or sutured wound)    Negative: Impetigo suspected  (i.e., painless infected superficial small sores, less than 1 inch or 2.5 cm, often covered by a soft, yellow-brown scab or crust; sometimes occurring near nasal openings)    Negative: Shingles suspected (i.e., painful rash, multiple small blisters grouped together in one area of body; dermatomal distribution)    Negative: Rash of external female genital area (vulva)    Negative: Rash of penis or scrotum    Negative: Small spot, skin growth, or mole    Negative: Sores or skin ulcer, not a rash    Negative: Localized lump (or swelling) without redness or rash    Negative: [1] Localized purple or blood-colored spots or dots AND [2] not from injury or friction AND [3] fever    Negative: Patient sounds very sick or weak to the triager    Negative: [1] Red area or streak AND [2] fever    Negative: [1] Rash is painful to touch AND [2] fever    Negative: [1] Looks infected (spreading redness, pus) AND [2] large red area (> 2 in. or 5 cm)    Negative: [1] Looks infected (spreading redness, pus) AND [2] diabetes mellitus or weak immune system (e.g., HIV positive, cancer chemo, splenectomy, organ transplant, chronic steroids)    Negative: [1] Localized purple or blood-colored spots or dots AND [2]  not from injury or friction AND [3] no fever    Protocols used: RASH OR REDNESS - [id number]    Leigh Ann Cortes RN on 6/4/2021 at 5:53 AM

## 2021-07-16 ENCOUNTER — OFFICE VISIT (OUTPATIENT)
Dept: URGENT CARE | Facility: URGENT CARE | Age: 46
End: 2021-07-16
Payer: COMMERCIAL

## 2021-07-16 VITALS
BODY MASS INDEX: 18.61 KG/M2 | WEIGHT: 109 LBS | DIASTOLIC BLOOD PRESSURE: 82 MMHG | TEMPERATURE: 98.6 F | OXYGEN SATURATION: 98 % | SYSTOLIC BLOOD PRESSURE: 118 MMHG | HEART RATE: 73 BPM | HEIGHT: 64 IN

## 2021-07-16 DIAGNOSIS — S01.81XA LACERATION OF FOREHEAD, INITIAL ENCOUNTER: Primary | ICD-10-CM

## 2021-07-16 DIAGNOSIS — S00.33XA CONTUSION OF NOSE, INITIAL ENCOUNTER: ICD-10-CM

## 2021-07-16 PROCEDURE — 12002 RPR S/N/AX/GEN/TRNK2.6-7.5CM: CPT | Performed by: PREVENTIVE MEDICINE

## 2021-07-16 PROCEDURE — 99214 OFFICE O/P EST MOD 30 MIN: CPT | Mod: 25 | Performed by: PREVENTIVE MEDICINE

## 2021-07-16 PROCEDURE — 90471 IMMUNIZATION ADMIN: CPT | Performed by: PREVENTIVE MEDICINE

## 2021-07-16 PROCEDURE — 90715 TDAP VACCINE 7 YRS/> IM: CPT | Performed by: PREVENTIVE MEDICINE

## 2021-07-16 ASSESSMENT — MIFFLIN-ST. JEOR: SCORE: 1114.42

## 2021-07-17 NOTE — PATIENT INSTRUCTIONS
You have a nose contusion.  Tylenol 500 mg three times per day for pain as needed.  Ice can help as well.  Skin adhesive will fall off in 7-10 days on its own.  No need to rub off.  Keep area dry until heals.  Watch for signs of infection (redness, warmth, swelling, tenderness) and follow up if these occur for antibiotics.
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
## 2021-07-18 ENCOUNTER — NURSE TRIAGE (OUTPATIENT)
Dept: NURSING | Facility: CLINIC | Age: 46
End: 2021-07-18

## 2021-07-18 ENCOUNTER — OFFICE VISIT (OUTPATIENT)
Dept: URGENT CARE | Facility: URGENT CARE | Age: 46
End: 2021-07-18
Payer: COMMERCIAL

## 2021-07-18 VITALS
HEIGHT: 64 IN | RESPIRATION RATE: 15 BRPM | WEIGHT: 109 LBS | BODY MASS INDEX: 18.61 KG/M2 | OXYGEN SATURATION: 100 % | TEMPERATURE: 98 F | HEART RATE: 63 BPM | DIASTOLIC BLOOD PRESSURE: 70 MMHG | SYSTOLIC BLOOD PRESSURE: 108 MMHG

## 2021-07-18 DIAGNOSIS — S05.10XD: Primary | ICD-10-CM

## 2021-07-18 PROCEDURE — 99213 OFFICE O/P EST LOW 20 MIN: CPT | Performed by: PHYSICIAN ASSISTANT

## 2021-07-18 ASSESSMENT — ENCOUNTER SYMPTOMS
PHOTOPHOBIA: 0
PSYCHIATRIC NEGATIVE: 1
EYE ITCHING: 0
RESPIRATORY NEGATIVE: 1
NEUROLOGICAL NEGATIVE: 1
CARDIOVASCULAR NEGATIVE: 1
GASTROINTESTINAL NEGATIVE: 1
CONSTITUTIONAL NEGATIVE: 1
EYE DISCHARGE: 0
MUSCULOSKELETAL NEGATIVE: 1
EYE PAIN: 1

## 2021-07-18 ASSESSMENT — MIFFLIN-ST. JEOR: SCORE: 1114.42
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
## 2021-09-05 ENCOUNTER — NURSE TRIAGE (OUTPATIENT)
Dept: NURSING | Facility: CLINIC | Age: 46
End: 2021-09-05

## 2021-09-05 NOTE — TELEPHONE ENCOUNTER
Patient had the first covid shot and had reaction but nothing severe. Patient had the second shot yesterday and now feels cold with chills and body is aching and headache is severe and sharp. Patient is able to drink water and tea. No fever. No redness or swelling at shot site.   Protocol suggests home care.   Care advice given.   Patient to call back if symptoms last beyond 3 days or worsen.   Hilda Elliott RN   09/05/21 4:34 PM  [facility] Nurse Advisor    . Reason for Disposition    COVID-19 vaccine, systemic reactions (e.g., fatigue, fever, muscle aches), questions about    Additional Information    Negative: [1] Difficulty breathing or swallowing AND [2] starts within 2 hours after injection    Negative: Sounds like a life-threatening emergency to the triager    Negative: [1] Has NOT completed COVID-19 vaccine series AND [2] COVID-19 exposure AND [2] AND [3] typical COVID-19 symptoms    Negative: [1] Has NOT completed COVID-19 vaccine series AND [2]  COVID-19 exposure AND [3] no symptoms    Negative: [1] COVID-19 vaccine series completed (fully vaccinated) in past 3 months AND [2] new-onset of COVID-19 symptoms BUT [3] no known exposure    Negative: [1] Typical COVID-19 symptoms AND [2] symptoms that are NOT expected from vaccine (e.g., cough, difficulty breathing, loss of taste or smell, runny nose, sore throat)    Negative: [1] Typical COVID-19 symptoms AND [2] started > 3 days after getting vaccine    Negative: Fever > 104 F (40 C)    Negative: Sounds like a severe, unusual reaction to the triager    Negative: [1] Redness or red streak around the injection site AND [2] started > 48 hours after getting vaccine AND [3] fever    Negative: [1] Fever > 101 F (38.3 C) AND [2] age > 60 years AND [3] started > 48 hours after getting vaccine    Negative: [1] Fever > 100.0 F (37.8 C) AND [2] bedridden (e.g., nursing home patient, CVA, chronic illness, recovering from surgery) AND [3] started > 48 hours after  getting vaccine    Negative: [1] Fever > 100.0 F (37.8 C) AND [2] diabetes mellitus or weak immune system (e.g., HIV positive, cancer chemo, splenectomy, organ transplant, chronic steroids) AND [3] started > 48 hours after getting vaccine    Negative: [1] Redness or red streak around the injection site AND [2] started > 48 hours after getting vaccine AND [3] no fever  (Exception: red area < 1 inch or 2.5 cm wide)    Negative: [1] Pain, tenderness, or swelling at the injection site AND [2] over 3 days (72 hours) since vaccine AND [3] getting worse    Negative: Fever > 100.0 F (37.8 C) present > 3 days (72 hours)    Negative: [1] Fever > 100.0 F (37.8 C) AND [2] healthcare worker    Negative: [1] Pain, tenderness, or swelling at the injection site AND [2] lasts > 7 days    Negative: [1] Lymph node swelling (i.e., armpit or neck on side of vaccine) AND [2] lasts > 3 weeks    Negative: [1] Requesting COVID-19 vaccine AND [2] healthcare worker (e.g., EMS first responders, doctors, nurses)    Negative: [1] Requesting COVID-19 vaccine AND [2] resident of a long-term care facility (e.g., nursing home)    Negative: [1] Requesting COVID-19 vaccine AND [2] vaccine available in the community for this patient group    Negative: COVID-19 vaccine, injection site reaction (e.g., pain, redness, swelling), question about    Protocols used: CORONAVIRUS (COVID-19) VACCINE QUESTIONS AND [id number] 3.25